# Patient Record
Sex: MALE | Race: WHITE | NOT HISPANIC OR LATINO | URBAN - METROPOLITAN AREA
[De-identification: names, ages, dates, MRNs, and addresses within clinical notes are randomized per-mention and may not be internally consistent; named-entity substitution may affect disease eponyms.]

---

## 2017-01-10 ASSESSMENT — MIFFLIN-ST. JEOR: SCORE: 1380.26

## 2017-01-12 ENCOUNTER — SURGERY - HEALTHEAST (OUTPATIENT)
Dept: SURGERY | Facility: CLINIC | Age: 82
End: 2017-01-12

## 2017-01-12 ENCOUNTER — ANESTHESIA - HEALTHEAST (OUTPATIENT)
Dept: SURGERY | Facility: CLINIC | Age: 82
End: 2017-01-12

## 2017-01-16 ENCOUNTER — COMMUNICATION - HEALTHEAST (OUTPATIENT)
Dept: INTERNAL MEDICINE | Facility: CLINIC | Age: 82
End: 2017-01-16

## 2017-01-26 ENCOUNTER — OFFICE VISIT - HEALTHEAST (OUTPATIENT)
Dept: INTERNAL MEDICINE | Facility: CLINIC | Age: 82
End: 2017-01-26

## 2017-01-26 ENCOUNTER — RECORDS - HEALTHEAST (OUTPATIENT)
Dept: ADMINISTRATIVE | Facility: OTHER | Age: 82
End: 2017-01-26

## 2017-01-26 DIAGNOSIS — K50.90 CROHN'S DISEASE WITHOUT COMPLICATION, UNSPECIFIED GASTROINTESTINAL TRACT LOCATION (H): ICD-10-CM

## 2017-01-26 ASSESSMENT — MIFFLIN-ST. JEOR: SCORE: 1407.47

## 2017-02-02 ENCOUNTER — RECORDS - HEALTHEAST (OUTPATIENT)
Dept: ADMINISTRATIVE | Facility: OTHER | Age: 82
End: 2017-02-02

## 2017-02-03 ENCOUNTER — RECORDS - HEALTHEAST (OUTPATIENT)
Dept: ADMINISTRATIVE | Facility: OTHER | Age: 82
End: 2017-02-03

## 2017-02-22 ENCOUNTER — COMMUNICATION - HEALTHEAST (OUTPATIENT)
Dept: INTERNAL MEDICINE | Facility: CLINIC | Age: 82
End: 2017-02-22

## 2017-03-07 ENCOUNTER — COMMUNICATION - HEALTHEAST (OUTPATIENT)
Dept: INTERNAL MEDICINE | Facility: CLINIC | Age: 82
End: 2017-03-07

## 2017-03-18 ENCOUNTER — COMMUNICATION - HEALTHEAST (OUTPATIENT)
Dept: INTERNAL MEDICINE | Facility: CLINIC | Age: 82
End: 2017-03-18

## 2017-03-18 DIAGNOSIS — J44.9 COPD (CHRONIC OBSTRUCTIVE PULMONARY DISEASE) (H): ICD-10-CM

## 2017-03-20 ENCOUNTER — COMMUNICATION - HEALTHEAST (OUTPATIENT)
Dept: INTERNAL MEDICINE | Facility: CLINIC | Age: 82
End: 2017-03-20

## 2017-03-21 ENCOUNTER — COMMUNICATION - HEALTHEAST (OUTPATIENT)
Dept: INTERNAL MEDICINE | Facility: CLINIC | Age: 82
End: 2017-03-21

## 2017-03-23 ENCOUNTER — OFFICE VISIT - HEALTHEAST (OUTPATIENT)
Dept: INTERNAL MEDICINE | Facility: CLINIC | Age: 82
End: 2017-03-23

## 2017-03-23 DIAGNOSIS — R13.10 DYSPHAGIA: ICD-10-CM

## 2017-03-23 ASSESSMENT — MIFFLIN-ST. JEOR: SCORE: 1448.29

## 2017-03-24 ENCOUNTER — COMMUNICATION - HEALTHEAST (OUTPATIENT)
Dept: INTERNAL MEDICINE | Facility: CLINIC | Age: 82
End: 2017-03-24

## 2017-03-24 ENCOUNTER — RECORDS - HEALTHEAST (OUTPATIENT)
Dept: ADMINISTRATIVE | Facility: OTHER | Age: 82
End: 2017-03-24

## 2017-04-13 ENCOUNTER — RECORDS - HEALTHEAST (OUTPATIENT)
Dept: ADMINISTRATIVE | Facility: OTHER | Age: 82
End: 2017-04-13

## 2017-05-04 ENCOUNTER — RECORDS - HEALTHEAST (OUTPATIENT)
Dept: ADMINISTRATIVE | Facility: OTHER | Age: 82
End: 2017-05-04

## 2017-05-14 ENCOUNTER — COMMUNICATION - HEALTHEAST (OUTPATIENT)
Dept: INTERNAL MEDICINE | Facility: CLINIC | Age: 82
End: 2017-05-14

## 2017-05-15 ENCOUNTER — COMMUNICATION - HEALTHEAST (OUTPATIENT)
Dept: INTERNAL MEDICINE | Facility: CLINIC | Age: 82
End: 2017-05-15

## 2017-05-15 DIAGNOSIS — G47.00 INSOMNIA, UNSPECIFIED TYPE: ICD-10-CM

## 2017-06-09 ENCOUNTER — COMMUNICATION - HEALTHEAST (OUTPATIENT)
Dept: SCHEDULING | Facility: CLINIC | Age: 82
End: 2017-06-09

## 2017-06-09 ENCOUNTER — OFFICE VISIT - HEALTHEAST (OUTPATIENT)
Dept: INTERNAL MEDICINE | Facility: CLINIC | Age: 82
End: 2017-06-09

## 2017-06-09 DIAGNOSIS — J42 CHRONIC BRONCHITIS, UNSPECIFIED CHRONIC BRONCHITIS TYPE (H): ICD-10-CM

## 2017-06-09 ASSESSMENT — MIFFLIN-ST. JEOR: SCORE: 1480.05

## 2017-06-12 ENCOUNTER — COMMUNICATION - HEALTHEAST (OUTPATIENT)
Dept: INTERNAL MEDICINE | Facility: CLINIC | Age: 82
End: 2017-06-12

## 2017-06-14 ENCOUNTER — COMMUNICATION - HEALTHEAST (OUTPATIENT)
Dept: INTERNAL MEDICINE | Facility: CLINIC | Age: 82
End: 2017-06-14

## 2017-06-15 ENCOUNTER — COMMUNICATION - HEALTHEAST (OUTPATIENT)
Dept: INTERNAL MEDICINE | Facility: CLINIC | Age: 82
End: 2017-06-15

## 2017-07-04 ENCOUNTER — COMMUNICATION - HEALTHEAST (OUTPATIENT)
Dept: INTERNAL MEDICINE | Facility: CLINIC | Age: 82
End: 2017-07-04

## 2017-07-04 DIAGNOSIS — J44.9 COPD (CHRONIC OBSTRUCTIVE PULMONARY DISEASE) (H): ICD-10-CM

## 2017-07-06 ENCOUNTER — RECORDS - HEALTHEAST (OUTPATIENT)
Dept: ADMINISTRATIVE | Facility: OTHER | Age: 82
End: 2017-07-06

## 2017-07-14 ENCOUNTER — RECORDS - HEALTHEAST (OUTPATIENT)
Dept: ADMINISTRATIVE | Facility: OTHER | Age: 82
End: 2017-07-14

## 2017-07-21 ENCOUNTER — RECORDS - HEALTHEAST (OUTPATIENT)
Dept: ADMINISTRATIVE | Facility: OTHER | Age: 82
End: 2017-07-21

## 2017-08-25 ENCOUNTER — COMMUNICATION - HEALTHEAST (OUTPATIENT)
Dept: SCHEDULING | Facility: CLINIC | Age: 82
End: 2017-08-25

## 2017-08-25 ENCOUNTER — AMBULATORY - HEALTHEAST (OUTPATIENT)
Dept: INTERNAL MEDICINE | Facility: CLINIC | Age: 82
End: 2017-08-25

## 2017-08-25 DIAGNOSIS — J44.9 COPD (CHRONIC OBSTRUCTIVE PULMONARY DISEASE) (H): ICD-10-CM

## 2017-08-28 ENCOUNTER — COMMUNICATION - HEALTHEAST (OUTPATIENT)
Dept: PULMONOLOGY | Facility: OTHER | Age: 82
End: 2017-08-28

## 2017-08-28 ENCOUNTER — AMBULATORY - HEALTHEAST (OUTPATIENT)
Dept: PULMONOLOGY | Facility: OTHER | Age: 82
End: 2017-08-28

## 2017-08-28 DIAGNOSIS — J44.9 COPD (CHRONIC OBSTRUCTIVE PULMONARY DISEASE) (H): ICD-10-CM

## 2017-09-08 ENCOUNTER — RECORDS - HEALTHEAST (OUTPATIENT)
Dept: ADMINISTRATIVE | Facility: OTHER | Age: 82
End: 2017-09-08

## 2017-09-14 ENCOUNTER — OFFICE VISIT - HEALTHEAST (OUTPATIENT)
Dept: PULMONOLOGY | Facility: OTHER | Age: 82
End: 2017-09-14

## 2017-09-14 DIAGNOSIS — J41.0 SIMPLE CHRONIC BRONCHITIS (H): ICD-10-CM

## 2017-09-14 DIAGNOSIS — J98.6 ELEVATED HEMIDIAPHRAGM: ICD-10-CM

## 2017-09-14 DIAGNOSIS — R06.09 DYSPNEA ON EXERTION: ICD-10-CM

## 2017-09-14 RX ORDER — FINASTERIDE 5 MG/1
5 TABLET, FILM COATED ORAL DAILY
Status: SHIPPED | COMMUNITY
Start: 2017-09-14

## 2017-09-14 ASSESSMENT — MIFFLIN-ST. JEOR: SCORE: 1485.94

## 2017-09-15 ENCOUNTER — RECORDS - HEALTHEAST (OUTPATIENT)
Dept: ADMINISTRATIVE | Facility: OTHER | Age: 82
End: 2017-09-15

## 2017-09-21 ENCOUNTER — RECORDS - HEALTHEAST (OUTPATIENT)
Dept: ADMINISTRATIVE | Facility: OTHER | Age: 82
End: 2017-09-21

## 2017-09-21 ENCOUNTER — AMBULATORY - HEALTHEAST (OUTPATIENT)
Dept: PULMONOLOGY | Facility: OTHER | Age: 82
End: 2017-09-21

## 2017-09-21 DIAGNOSIS — R06.02 SOB (SHORTNESS OF BREATH): ICD-10-CM

## 2017-09-21 DIAGNOSIS — J44.1 COPD EXACERBATION (H): ICD-10-CM

## 2017-09-28 ENCOUNTER — OFFICE VISIT - HEALTHEAST (OUTPATIENT)
Dept: PULMONOLOGY | Facility: OTHER | Age: 82
End: 2017-09-28

## 2017-09-28 DIAGNOSIS — J41.0 SIMPLE CHRONIC BRONCHITIS (H): ICD-10-CM

## 2017-09-28 DIAGNOSIS — F41.9 ANXIETY: ICD-10-CM

## 2017-09-28 DIAGNOSIS — J96.11 CHRONIC RESPIRATORY FAILURE WITH HYPOXIA (H): ICD-10-CM

## 2017-09-29 ENCOUNTER — AMBULATORY - HEALTHEAST (OUTPATIENT)
Dept: PULMONOLOGY | Facility: OTHER | Age: 82
End: 2017-09-29

## 2017-09-29 ENCOUNTER — RECORDS - HEALTHEAST (OUTPATIENT)
Dept: ADMINISTRATIVE | Facility: OTHER | Age: 82
End: 2017-09-29

## 2017-09-29 DIAGNOSIS — J41.0 SIMPLE CHRONIC BRONCHITIS (H): ICD-10-CM

## 2017-10-03 ENCOUNTER — COMMUNICATION - HEALTHEAST (OUTPATIENT)
Dept: PULMONOLOGY | Facility: OTHER | Age: 82
End: 2017-10-03

## 2017-10-03 DIAGNOSIS — R19.7 DIARRHEA: ICD-10-CM

## 2017-10-08 ENCOUNTER — COMMUNICATION - HEALTHEAST (OUTPATIENT)
Dept: INTERNAL MEDICINE | Facility: CLINIC | Age: 82
End: 2017-10-08

## 2017-10-10 ENCOUNTER — COMMUNICATION - HEALTHEAST (OUTPATIENT)
Dept: INTERNAL MEDICINE | Facility: CLINIC | Age: 82
End: 2017-10-10

## 2017-10-12 ENCOUNTER — OFFICE VISIT - HEALTHEAST (OUTPATIENT)
Dept: INTERNAL MEDICINE | Facility: CLINIC | Age: 82
End: 2017-10-12

## 2017-10-12 DIAGNOSIS — K50.90 CROHN'S DISEASE WITHOUT COMPLICATION, UNSPECIFIED GASTROINTESTINAL TRACT LOCATION (H): ICD-10-CM

## 2017-10-12 DIAGNOSIS — R53.83 FATIGUE DUE TO TREATMENT: ICD-10-CM

## 2017-10-12 DIAGNOSIS — I10 ESSENTIAL HYPERTENSION WITH GOAL BLOOD PRESSURE LESS THAN 140/90: ICD-10-CM

## 2017-10-12 DIAGNOSIS — G25.0 ESSENTIAL TREMOR: ICD-10-CM

## 2017-10-12 DIAGNOSIS — J41.0 SIMPLE CHRONIC BRONCHITIS (H): ICD-10-CM

## 2017-10-12 DIAGNOSIS — K21.9 GERD WITHOUT ESOPHAGITIS: ICD-10-CM

## 2017-10-12 ASSESSMENT — MIFFLIN-ST. JEOR: SCORE: 1452.83

## 2017-10-13 ENCOUNTER — COMMUNICATION - HEALTHEAST (OUTPATIENT)
Dept: INTERNAL MEDICINE | Facility: CLINIC | Age: 82
End: 2017-10-13

## 2017-10-19 ENCOUNTER — RECORDS - HEALTHEAST (OUTPATIENT)
Dept: ADMINISTRATIVE | Facility: OTHER | Age: 82
End: 2017-10-19

## 2017-10-26 ENCOUNTER — OFFICE VISIT - HEALTHEAST (OUTPATIENT)
Dept: INTERNAL MEDICINE | Facility: CLINIC | Age: 82
End: 2017-10-26

## 2017-10-26 DIAGNOSIS — J41.0 SIMPLE CHRONIC BRONCHITIS (H): ICD-10-CM

## 2017-11-01 ENCOUNTER — COMMUNICATION - HEALTHEAST (OUTPATIENT)
Dept: INTERNAL MEDICINE | Facility: CLINIC | Age: 82
End: 2017-11-01

## 2017-11-01 DIAGNOSIS — G47.00 INSOMNIA, UNSPECIFIED TYPE: ICD-10-CM

## 2017-11-09 ENCOUNTER — OFFICE VISIT - HEALTHEAST (OUTPATIENT)
Dept: PULMONOLOGY | Facility: OTHER | Age: 82
End: 2017-11-09

## 2017-11-09 ENCOUNTER — RECORDS - HEALTHEAST (OUTPATIENT)
Dept: ADMINISTRATIVE | Facility: OTHER | Age: 82
End: 2017-11-09

## 2017-11-09 DIAGNOSIS — G47.30 SLEEP-DISORDERED BREATHING: ICD-10-CM

## 2017-11-09 DIAGNOSIS — J41.0 SIMPLE CHRONIC BRONCHITIS (H): ICD-10-CM

## 2017-11-09 DIAGNOSIS — J96.11 CHRONIC RESPIRATORY FAILURE WITH HYPOXIA (H): ICD-10-CM

## 2017-11-10 ENCOUNTER — RECORDS - HEALTHEAST (OUTPATIENT)
Dept: ADMINISTRATIVE | Facility: OTHER | Age: 82
End: 2017-11-10

## 2017-11-14 ENCOUNTER — RECORDS - HEALTHEAST (OUTPATIENT)
Dept: ADMINISTRATIVE | Facility: OTHER | Age: 82
End: 2017-11-14

## 2017-11-20 ENCOUNTER — COMMUNICATION - HEALTHEAST (OUTPATIENT)
Dept: INTERNAL MEDICINE | Facility: CLINIC | Age: 82
End: 2017-11-20

## 2017-11-21 ENCOUNTER — COMMUNICATION - HEALTHEAST (OUTPATIENT)
Dept: PULMONOLOGY | Facility: OTHER | Age: 82
End: 2017-11-21

## 2017-11-21 ENCOUNTER — RECORDS - HEALTHEAST (OUTPATIENT)
Dept: ADMINISTRATIVE | Facility: OTHER | Age: 82
End: 2017-11-21

## 2017-11-27 ENCOUNTER — OFFICE VISIT - HEALTHEAST (OUTPATIENT)
Dept: INTERNAL MEDICINE | Facility: CLINIC | Age: 82
End: 2017-11-27

## 2017-11-27 ENCOUNTER — RECORDS - HEALTHEAST (OUTPATIENT)
Dept: ADMINISTRATIVE | Facility: OTHER | Age: 82
End: 2017-11-27

## 2017-11-27 ENCOUNTER — AMBULATORY - HEALTHEAST (OUTPATIENT)
Dept: INTERNAL MEDICINE | Facility: CLINIC | Age: 82
End: 2017-11-27

## 2017-11-27 DIAGNOSIS — J41.0 SIMPLE CHRONIC BRONCHITIS (H): ICD-10-CM

## 2017-11-27 ASSESSMENT — MIFFLIN-ST. JEOR: SCORE: 1475.51

## 2017-11-28 ENCOUNTER — COMMUNICATION - HEALTHEAST (OUTPATIENT)
Dept: INTERNAL MEDICINE | Facility: CLINIC | Age: 82
End: 2017-11-28

## 2017-11-29 ENCOUNTER — RECORDS - HEALTHEAST (OUTPATIENT)
Dept: ADMINISTRATIVE | Facility: OTHER | Age: 82
End: 2017-11-29

## 2017-12-08 ENCOUNTER — COMMUNICATION - HEALTHEAST (OUTPATIENT)
Dept: INTERNAL MEDICINE | Facility: CLINIC | Age: 82
End: 2017-12-08

## 2018-01-29 ENCOUNTER — OFFICE VISIT - HEALTHEAST (OUTPATIENT)
Dept: INTERNAL MEDICINE | Facility: CLINIC | Age: 83
End: 2018-01-29

## 2018-01-29 DIAGNOSIS — K50.119 CROHN'S DISEASE OF LARGE INTESTINE WITH COMPLICATION (H): ICD-10-CM

## 2018-01-29 ASSESSMENT — MIFFLIN-ST. JEOR: SCORE: 1489.12

## 2018-02-02 ENCOUNTER — RECORDS - HEALTHEAST (OUTPATIENT)
Dept: ADMINISTRATIVE | Facility: OTHER | Age: 83
End: 2018-02-02

## 2018-02-27 ENCOUNTER — OFFICE VISIT - HEALTHEAST (OUTPATIENT)
Dept: PULMONOLOGY | Facility: OTHER | Age: 83
End: 2018-02-27

## 2018-02-27 DIAGNOSIS — R06.09 DYSPNEA ON EXERTION: ICD-10-CM

## 2018-02-27 DIAGNOSIS — J41.0 SIMPLE CHRONIC BRONCHITIS (H): ICD-10-CM

## 2018-03-12 ENCOUNTER — COMMUNICATION - HEALTHEAST (OUTPATIENT)
Dept: INTERNAL MEDICINE | Facility: CLINIC | Age: 83
End: 2018-03-12

## 2018-03-20 ENCOUNTER — OFFICE VISIT - HEALTHEAST (OUTPATIENT)
Dept: INTERNAL MEDICINE | Facility: CLINIC | Age: 83
End: 2018-03-20

## 2018-03-20 DIAGNOSIS — J41.0 SIMPLE CHRONIC BRONCHITIS (H): ICD-10-CM

## 2018-03-20 ASSESSMENT — MIFFLIN-ST. JEOR: SCORE: 1502.73

## 2018-03-29 ENCOUNTER — RECORDS - HEALTHEAST (OUTPATIENT)
Dept: ADMINISTRATIVE | Facility: OTHER | Age: 83
End: 2018-03-29

## 2018-04-24 ENCOUNTER — COMMUNICATION - HEALTHEAST (OUTPATIENT)
Dept: SCHEDULING | Facility: CLINIC | Age: 83
End: 2018-04-24

## 2018-04-24 DIAGNOSIS — R06.2 WHEEZING: ICD-10-CM

## 2018-04-24 RX ORDER — ALBUTEROL SULFATE 90 UG/1
2 AEROSOL, METERED RESPIRATORY (INHALATION) EVERY 6 HOURS PRN
Qty: 1 INHALER | Refills: 3 | Status: SHIPPED | OUTPATIENT
Start: 2018-04-24

## 2018-06-12 ENCOUNTER — AMBULATORY - HEALTHEAST (OUTPATIENT)
Dept: PULMONOLOGY | Facility: OTHER | Age: 83
End: 2018-06-12

## 2018-06-12 ENCOUNTER — COMMUNICATION - HEALTHEAST (OUTPATIENT)
Dept: PULMONOLOGY | Facility: OTHER | Age: 83
End: 2018-06-12

## 2018-06-12 DIAGNOSIS — J41.0 SIMPLE CHRONIC BRONCHITIS (H): ICD-10-CM

## 2018-06-22 ENCOUNTER — OFFICE VISIT - HEALTHEAST (OUTPATIENT)
Dept: INTERNAL MEDICINE | Facility: CLINIC | Age: 83
End: 2018-06-22

## 2018-06-22 DIAGNOSIS — R06.02 SOB (SHORTNESS OF BREATH): ICD-10-CM

## 2018-06-22 DIAGNOSIS — J41.0 SIMPLE CHRONIC BRONCHITIS (H): ICD-10-CM

## 2018-06-22 ASSESSMENT — MIFFLIN-ST. JEOR: SCORE: 1557.16

## 2018-06-28 ENCOUNTER — RECORDS - HEALTHEAST (OUTPATIENT)
Dept: ADMINISTRATIVE | Facility: OTHER | Age: 83
End: 2018-06-28

## 2018-07-24 ENCOUNTER — OFFICE VISIT - HEALTHEAST (OUTPATIENT)
Dept: PULMONOLOGY | Facility: OTHER | Age: 83
End: 2018-07-24

## 2018-07-24 DIAGNOSIS — J43.9 PULMONARY EMPHYSEMA, UNSPECIFIED EMPHYSEMA TYPE (H): ICD-10-CM

## 2018-07-30 ENCOUNTER — COMMUNICATION - HEALTHEAST (OUTPATIENT)
Dept: INTERNAL MEDICINE | Facility: CLINIC | Age: 83
End: 2018-07-30

## 2018-09-05 ENCOUNTER — COMMUNICATION - HEALTHEAST (OUTPATIENT)
Dept: INTERNAL MEDICINE | Facility: CLINIC | Age: 83
End: 2018-09-05

## 2018-09-05 DIAGNOSIS — G47.00 INSOMNIA, UNSPECIFIED TYPE: ICD-10-CM

## 2018-09-12 ENCOUNTER — COMMUNICATION - HEALTHEAST (OUTPATIENT)
Dept: INTERNAL MEDICINE | Facility: CLINIC | Age: 83
End: 2018-09-12

## 2018-09-27 ENCOUNTER — RECORDS - HEALTHEAST (OUTPATIENT)
Dept: ADMINISTRATIVE | Facility: OTHER | Age: 83
End: 2018-09-27

## 2018-09-27 ENCOUNTER — OFFICE VISIT - HEALTHEAST (OUTPATIENT)
Dept: INTERNAL MEDICINE | Facility: CLINIC | Age: 83
End: 2018-09-27

## 2018-09-27 DIAGNOSIS — I10 ESSENTIAL HYPERTENSION WITH GOAL BLOOD PRESSURE LESS THAN 140/90: ICD-10-CM

## 2018-09-27 ASSESSMENT — MIFFLIN-ST. JEOR: SCORE: 1520.87

## 2018-10-04 ENCOUNTER — COMMUNICATION - HEALTHEAST (OUTPATIENT)
Dept: INTERNAL MEDICINE | Facility: CLINIC | Age: 83
End: 2018-10-04

## 2018-10-11 ENCOUNTER — OFFICE VISIT - HEALTHEAST (OUTPATIENT)
Dept: INTERNAL MEDICINE | Facility: CLINIC | Age: 83
End: 2018-10-11

## 2018-10-11 DIAGNOSIS — R06.02 SOB (SHORTNESS OF BREATH): ICD-10-CM

## 2018-10-11 DIAGNOSIS — Z01.818 PREOPERATIVE EXAMINATION: ICD-10-CM

## 2018-10-11 LAB
ALBUMIN SERPL-MCNC: 3.3 G/DL (ref 3.5–5)
ALP SERPL-CCNC: 70 U/L (ref 45–120)
ALT SERPL W P-5'-P-CCNC: 16 U/L (ref 0–45)
ANION GAP SERPL CALCULATED.3IONS-SCNC: 9 MMOL/L (ref 5–18)
AST SERPL W P-5'-P-CCNC: 18 U/L (ref 0–40)
ATRIAL RATE - MUSE: 59 BPM
BILIRUB SERPL-MCNC: 1 MG/DL (ref 0–1)
BNP SERPL-MCNC: 229 PG/ML (ref 0–93)
BUN SERPL-MCNC: 18 MG/DL (ref 8–28)
CALCIUM SERPL-MCNC: 8.9 MG/DL (ref 8.5–10.5)
CHLORIDE BLD-SCNC: 105 MMOL/L (ref 98–107)
CO2 SERPL-SCNC: 27 MMOL/L (ref 22–31)
CREAT SERPL-MCNC: 1.44 MG/DL (ref 0.7–1.3)
DIASTOLIC BLOOD PRESSURE - MUSE: NORMAL MMHG
ERYTHROCYTE [DISTWIDTH] IN BLOOD BY AUTOMATED COUNT: 13.8 % (ref 11–14.5)
GFR SERPL CREATININE-BSD FRML MDRD: 47 ML/MIN/1.73M2
GLUCOSE BLD-MCNC: 96 MG/DL (ref 70–125)
HCT VFR BLD AUTO: 47 % (ref 40–54)
HGB BLD-MCNC: 15 G/DL (ref 14–18)
INTERPRETATION ECG - MUSE: NORMAL
MCH RBC QN AUTO: 28.4 PG (ref 27–34)
MCHC RBC AUTO-ENTMCNC: 31.9 G/DL (ref 32–36)
MCV RBC AUTO: 89 FL (ref 80–100)
P AXIS - MUSE: 6 DEGREES
PLATELET # BLD AUTO: 159 THOU/UL (ref 140–440)
PMV BLD AUTO: 7.7 FL (ref 7–10)
POTASSIUM BLD-SCNC: 4.1 MMOL/L (ref 3.5–5)
PR INTERVAL - MUSE: 172 MS
PROT SERPL-MCNC: 5.9 G/DL (ref 6–8)
QRS DURATION - MUSE: 132 MS
QT - MUSE: 478 MS
QTC - MUSE: 473 MS
R AXIS - MUSE: -77 DEGREES
RBC # BLD AUTO: 5.27 MILL/UL (ref 4.4–6.2)
SODIUM SERPL-SCNC: 141 MMOL/L (ref 136–145)
SYSTOLIC BLOOD PRESSURE - MUSE: NORMAL MMHG
T AXIS - MUSE: 20 DEGREES
TSH SERPL DL<=0.005 MIU/L-ACNC: 0.94 UIU/ML (ref 0.3–5)
VENTRICULAR RATE- MUSE: 59 BPM
WBC: 8.2 THOU/UL (ref 4–11)

## 2018-10-11 RX ORDER — DIAZEPAM 2 MG
2 TABLET ORAL 2 TIMES DAILY
Status: SHIPPED | COMMUNITY
Start: 2018-10-11

## 2018-10-11 RX ORDER — FUROSEMIDE 20 MG
20 TABLET ORAL DAILY
Qty: 7 TABLET | Refills: 0 | Status: SHIPPED | OUTPATIENT
Start: 2018-10-11 | End: 2018-11-14

## 2018-10-11 ASSESSMENT — MIFFLIN-ST. JEOR: SCORE: 1525.41

## 2018-10-12 ENCOUNTER — COMMUNICATION - HEALTHEAST (OUTPATIENT)
Dept: INTERNAL MEDICINE | Facility: CLINIC | Age: 83
End: 2018-10-12

## 2018-10-21 ENCOUNTER — COMMUNICATION - HEALTHEAST (OUTPATIENT)
Dept: INTERNAL MEDICINE | Facility: CLINIC | Age: 83
End: 2018-10-21

## 2018-10-22 ENCOUNTER — COMMUNICATION - HEALTHEAST (OUTPATIENT)
Dept: INTERNAL MEDICINE | Facility: CLINIC | Age: 83
End: 2018-10-22

## 2018-10-26 ENCOUNTER — RECORDS - HEALTHEAST (OUTPATIENT)
Dept: ADMINISTRATIVE | Facility: OTHER | Age: 83
End: 2018-10-26

## 2018-10-29 ENCOUNTER — OFFICE VISIT - HEALTHEAST (OUTPATIENT)
Dept: INTERNAL MEDICINE | Facility: CLINIC | Age: 83
End: 2018-10-29

## 2018-10-29 DIAGNOSIS — K50.90 CROHN'S DISEASE WITHOUT COMPLICATION, UNSPECIFIED GASTROINTESTINAL TRACT LOCATION (H): ICD-10-CM

## 2018-10-29 ASSESSMENT — MIFFLIN-ST. JEOR: SCORE: 1511.8

## 2018-11-14 ENCOUNTER — OFFICE VISIT - HEALTHEAST (OUTPATIENT)
Dept: PULMONOLOGY | Facility: OTHER | Age: 83
End: 2018-11-14

## 2018-11-14 DIAGNOSIS — J41.0 SIMPLE CHRONIC BRONCHITIS (H): ICD-10-CM

## 2018-11-14 RX ORDER — ALBUTEROL SULFATE 0.83 MG/ML
2.5 SOLUTION RESPIRATORY (INHALATION) EVERY 6 HOURS PRN
Qty: 90 VIAL | Refills: 11 | Status: SHIPPED | OUTPATIENT
Start: 2018-11-14

## 2018-12-17 ENCOUNTER — RECORDS - HEALTHEAST (OUTPATIENT)
Dept: ADMINISTRATIVE | Facility: OTHER | Age: 83
End: 2018-12-17

## 2018-12-29 ENCOUNTER — COMMUNICATION - HEALTHEAST (OUTPATIENT)
Dept: INTERNAL MEDICINE | Facility: CLINIC | Age: 83
End: 2018-12-29

## 2019-01-24 ENCOUNTER — AMBULATORY - HEALTHEAST (OUTPATIENT)
Dept: INTERNAL MEDICINE | Facility: CLINIC | Age: 84
End: 2019-01-24

## 2019-01-24 ENCOUNTER — OFFICE VISIT - HEALTHEAST (OUTPATIENT)
Dept: INTERNAL MEDICINE | Facility: CLINIC | Age: 84
End: 2019-01-24

## 2019-01-24 DIAGNOSIS — I10 ESSENTIAL HYPERTENSION: ICD-10-CM

## 2019-01-24 ASSESSMENT — MIFFLIN-ST. JEOR: SCORE: 1511.8

## 2019-01-28 ENCOUNTER — COMMUNICATION - HEALTHEAST (OUTPATIENT)
Dept: INTERNAL MEDICINE | Facility: CLINIC | Age: 84
End: 2019-01-28

## 2019-01-28 DIAGNOSIS — R11.0 NAUSEA: ICD-10-CM

## 2019-01-28 DIAGNOSIS — R19.7 DIARRHEA: ICD-10-CM

## 2019-01-28 RX ORDER — ONDANSETRON 4 MG/1
4 TABLET, FILM COATED ORAL DAILY PRN
Qty: 40 TABLET | Refills: 1 | Status: SHIPPED | OUTPATIENT
Start: 2019-01-28

## 2019-01-28 RX ORDER — DIPHENOXYLATE HCL/ATROPINE 2.5-.025MG
1 TABLET ORAL DAILY
Qty: 40 TABLET | Refills: 1 | Status: SHIPPED | OUTPATIENT
Start: 2019-01-28

## 2019-02-25 ENCOUNTER — COMMUNICATION - HEALTHEAST (OUTPATIENT)
Dept: INTERNAL MEDICINE | Facility: CLINIC | Age: 84
End: 2019-02-25

## 2019-02-26 RX ORDER — TAMSULOSIN HYDROCHLORIDE 0.4 MG/1
CAPSULE ORAL
Qty: 90 CAPSULE | Refills: 6 | Status: SHIPPED | OUTPATIENT
Start: 2019-02-26

## 2019-03-18 ENCOUNTER — COMMUNICATION - HEALTHEAST (OUTPATIENT)
Dept: INTERNAL MEDICINE | Facility: CLINIC | Age: 84
End: 2019-03-18

## 2019-03-20 ENCOUNTER — COMMUNICATION - HEALTHEAST (OUTPATIENT)
Dept: INTERNAL MEDICINE | Facility: CLINIC | Age: 84
End: 2019-03-20

## 2019-03-28 ENCOUNTER — RECORDS - HEALTHEAST (OUTPATIENT)
Dept: ADMINISTRATIVE | Facility: OTHER | Age: 84
End: 2019-03-28

## 2019-04-08 ENCOUNTER — COMMUNICATION - HEALTHEAST (OUTPATIENT)
Dept: INTERNAL MEDICINE | Facility: CLINIC | Age: 84
End: 2019-04-08

## 2019-04-08 DIAGNOSIS — K21.9 GERD WITHOUT ESOPHAGITIS: ICD-10-CM

## 2019-04-08 DIAGNOSIS — K50.90 CROHN'S DISEASE WITHOUT COMPLICATION, UNSPECIFIED GASTROINTESTINAL TRACT LOCATION (H): ICD-10-CM

## 2019-04-12 ENCOUNTER — OFFICE VISIT - HEALTHEAST (OUTPATIENT)
Dept: INTERNAL MEDICINE | Facility: CLINIC | Age: 84
End: 2019-04-12

## 2019-04-12 ENCOUNTER — COMMUNICATION - HEALTHEAST (OUTPATIENT)
Dept: INTERNAL MEDICINE | Facility: CLINIC | Age: 84
End: 2019-04-12

## 2019-04-12 DIAGNOSIS — J41.1 MUCOPURULENT CHRONIC BRONCHITIS (H): ICD-10-CM

## 2019-04-12 DIAGNOSIS — J44.1 CHRONIC OBSTRUCTIVE PULMONARY DISEASE WITH ACUTE EXACERBATION (H): ICD-10-CM

## 2019-04-12 LAB
ANION GAP SERPL CALCULATED.3IONS-SCNC: 11 MMOL/L (ref 5–18)
BNP SERPL-MCNC: 188 PG/ML (ref 0–93)
BUN SERPL-MCNC: 22 MG/DL (ref 8–28)
CALCIUM SERPL-MCNC: 9.2 MG/DL (ref 8.5–10.5)
CHLORIDE BLD-SCNC: 106 MMOL/L (ref 98–107)
CO2 SERPL-SCNC: 25 MMOL/L (ref 22–31)
CREAT SERPL-MCNC: 1.46 MG/DL (ref 0.7–1.3)
GFR SERPL CREATININE-BSD FRML MDRD: 46 ML/MIN/1.73M2
GLUCOSE BLD-MCNC: 98 MG/DL (ref 70–125)
POTASSIUM BLD-SCNC: 4 MMOL/L (ref 3.5–5)
SODIUM SERPL-SCNC: 142 MMOL/L (ref 136–145)

## 2019-04-12 ASSESSMENT — MIFFLIN-ST. JEOR: SCORE: 1525.41

## 2019-04-13 ENCOUNTER — COMMUNICATION - HEALTHEAST (OUTPATIENT)
Dept: SCHEDULING | Facility: CLINIC | Age: 84
End: 2019-04-13

## 2019-05-06 ENCOUNTER — OFFICE VISIT - HEALTHEAST (OUTPATIENT)
Dept: INTERNAL MEDICINE | Facility: CLINIC | Age: 84
End: 2019-05-06

## 2019-05-06 DIAGNOSIS — J41.1 MUCOPURULENT CHRONIC BRONCHITIS (H): ICD-10-CM

## 2019-05-06 ASSESSMENT — MIFFLIN-ST. JEOR: SCORE: 1520.87

## 2019-05-10 ENCOUNTER — AMBULATORY - HEALTHEAST (OUTPATIENT)
Dept: PULMONOLOGY | Facility: OTHER | Age: 84
End: 2019-05-10

## 2019-05-10 DIAGNOSIS — J44.9 COPD (CHRONIC OBSTRUCTIVE PULMONARY DISEASE) (H): ICD-10-CM

## 2019-05-21 ENCOUNTER — COMMUNICATION - HEALTHEAST (OUTPATIENT)
Dept: INTERNAL MEDICINE | Facility: CLINIC | Age: 84
End: 2019-05-21

## 2019-05-21 DIAGNOSIS — K50.90 CROHN'S DISEASE WITHOUT COMPLICATION, UNSPECIFIED GASTROINTESTINAL TRACT LOCATION (H): ICD-10-CM

## 2019-05-29 ENCOUNTER — COMMUNICATION - HEALTHEAST (OUTPATIENT)
Dept: PULMONOLOGY | Facility: OTHER | Age: 84
End: 2019-05-29

## 2019-05-29 ENCOUNTER — AMBULATORY - HEALTHEAST (OUTPATIENT)
Dept: PULMONOLOGY | Facility: OTHER | Age: 84
End: 2019-05-29

## 2019-05-29 DIAGNOSIS — J44.1 COPD EXACERBATION (H): ICD-10-CM

## 2019-06-03 ENCOUNTER — OFFICE VISIT - HEALTHEAST (OUTPATIENT)
Dept: PULMONOLOGY | Facility: OTHER | Age: 84
End: 2019-06-03

## 2019-06-03 ENCOUNTER — HOSPITAL ENCOUNTER (OUTPATIENT)
Dept: RADIOLOGY | Facility: HOSPITAL | Age: 84
Discharge: HOME | End: 2019-06-03
Attending: INTERNAL MEDICINE

## 2019-06-03 DIAGNOSIS — J41.0 SIMPLE CHRONIC BRONCHITIS (H): ICD-10-CM

## 2019-06-03 RX ORDER — FUROSEMIDE 40 MG
40 TABLET ORAL DAILY
Qty: 5 TABLET | Refills: 0 | Status: SHIPPED | OUTPATIENT
Start: 2019-06-03 | End: 2019-06-08

## 2019-06-05 ENCOUNTER — COMMUNICATION - HEALTHEAST (OUTPATIENT)
Dept: PULMONOLOGY | Facility: OTHER | Age: 84
End: 2019-06-05

## 2019-06-19 ENCOUNTER — RECORDS - HEALTHEAST (OUTPATIENT)
Dept: ADMINISTRATIVE | Facility: OTHER | Age: 84
End: 2019-06-19

## 2019-06-21 ENCOUNTER — RECORDS - HEALTHEAST (OUTPATIENT)
Dept: ADMINISTRATIVE | Facility: OTHER | Age: 84
End: 2019-06-21

## 2019-06-24 ENCOUNTER — COMMUNICATION - HEALTHEAST (OUTPATIENT)
Dept: SCHEDULING | Facility: CLINIC | Age: 84
End: 2019-06-24

## 2019-07-15 ENCOUNTER — COMMUNICATION - HEALTHEAST (OUTPATIENT)
Dept: INTERNAL MEDICINE | Facility: CLINIC | Age: 84
End: 2019-07-15

## 2019-07-15 DIAGNOSIS — K50.90 CROHN'S DISEASE WITHOUT COMPLICATION, UNSPECIFIED GASTROINTESTINAL TRACT LOCATION (H): ICD-10-CM

## 2019-07-15 DIAGNOSIS — G47.00 INSOMNIA, UNSPECIFIED TYPE: ICD-10-CM

## 2019-07-16 RX ORDER — TRAZODONE HYDROCHLORIDE 100 MG/1
TABLET ORAL
Qty: 90 TABLET | Refills: 3 | Status: SHIPPED | OUTPATIENT
Start: 2019-07-16

## 2019-07-16 RX ORDER — TRAMADOL HYDROCHLORIDE 50 MG/1
TABLET ORAL
Qty: 30 TABLET | Refills: 1 | Status: SHIPPED | OUTPATIENT
Start: 2019-07-16

## 2019-08-05 ENCOUNTER — COMMUNICATION - HEALTHEAST (OUTPATIENT)
Dept: SCHEDULING | Facility: CLINIC | Age: 84
End: 2019-08-05

## 2019-08-06 ENCOUNTER — AMBULATORY - HEALTHEAST (OUTPATIENT)
Dept: INTERNAL MEDICINE | Facility: CLINIC | Age: 84
End: 2019-08-06

## 2019-08-06 ENCOUNTER — COMMUNICATION - HEALTHEAST (OUTPATIENT)
Dept: INTERNAL MEDICINE | Facility: CLINIC | Age: 84
End: 2019-08-06

## 2019-08-06 ENCOUNTER — OFFICE VISIT - HEALTHEAST (OUTPATIENT)
Dept: INTERNAL MEDICINE | Facility: CLINIC | Age: 84
End: 2019-08-06

## 2019-08-06 DIAGNOSIS — J41.1 MUCOPURULENT CHRONIC BRONCHITIS (H): ICD-10-CM

## 2019-08-06 LAB
ALBUMIN SERPL-MCNC: 2.1 G/DL (ref 3.5–5)
ALP SERPL-CCNC: 118 U/L (ref 45–120)
ALT SERPL W P-5'-P-CCNC: 14 U/L (ref 0–45)
ANION GAP SERPL CALCULATED.3IONS-SCNC: 10 MMOL/L (ref 5–18)
AST SERPL W P-5'-P-CCNC: 11 U/L (ref 0–40)
BILIRUB SERPL-MCNC: 0.7 MG/DL (ref 0–1)
BUN SERPL-MCNC: 24 MG/DL (ref 8–28)
CALCIUM SERPL-MCNC: 9.2 MG/DL (ref 8.5–10.5)
CHLORIDE BLD-SCNC: 104 MMOL/L (ref 98–107)
CO2 SERPL-SCNC: 25 MMOL/L (ref 22–31)
CREAT SERPL-MCNC: 1.3 MG/DL (ref 0.7–1.3)
ERYTHROCYTE [DISTWIDTH] IN BLOOD BY AUTOMATED COUNT: 11.8 % (ref 11–14.5)
GFR SERPL CREATININE-BSD FRML MDRD: 53 ML/MIN/1.73M2
GLUCOSE BLD-MCNC: 105 MG/DL (ref 70–125)
HCT VFR BLD AUTO: 42.4 % (ref 40–54)
HGB BLD-MCNC: 13.8 G/DL (ref 14–18)
MCH RBC QN AUTO: 30.8 PG (ref 27–34)
MCHC RBC AUTO-ENTMCNC: 32.5 G/DL (ref 32–36)
MCV RBC AUTO: 95 FL (ref 80–100)
PLATELET # BLD AUTO: 292 THOU/UL (ref 140–440)
PMV BLD AUTO: 7.6 FL (ref 7–10)
POTASSIUM BLD-SCNC: 4.2 MMOL/L (ref 3.5–5)
PROT SERPL-MCNC: 6.5 G/DL (ref 6–8)
RBC # BLD AUTO: 4.49 MILL/UL (ref 4.4–6.2)
SODIUM SERPL-SCNC: 139 MMOL/L (ref 136–145)
WBC: 21.8 THOU/UL (ref 4–11)

## 2019-08-06 ASSESSMENT — MIFFLIN-ST. JEOR: SCORE: 1498.19

## 2019-08-07 ENCOUNTER — COMMUNICATION - HEALTHEAST (OUTPATIENT)
Dept: INTERNAL MEDICINE | Facility: CLINIC | Age: 84
End: 2019-08-07

## 2019-08-13 ENCOUNTER — RECORDS - HEALTHEAST (OUTPATIENT)
Dept: ADMINISTRATIVE | Facility: OTHER | Age: 84
End: 2019-08-13

## 2019-08-15 ENCOUNTER — COMMUNICATION - HEALTHEAST (OUTPATIENT)
Dept: INTERNAL MEDICINE | Facility: CLINIC | Age: 84
End: 2019-08-15

## 2019-08-23 ENCOUNTER — COMMUNICATION - HEALTHEAST (OUTPATIENT)
Dept: SCHEDULING | Facility: CLINIC | Age: 84
End: 2019-08-23

## 2019-08-24 ENCOUNTER — RECORDS - HEALTHEAST (OUTPATIENT)
Dept: ADMINISTRATIVE | Facility: OTHER | Age: 84
End: 2019-08-24

## 2019-08-26 ENCOUNTER — COMMUNICATION - HEALTHEAST (OUTPATIENT)
Dept: INTERNAL MEDICINE | Facility: CLINIC | Age: 84
End: 2019-08-26

## 2019-08-29 ENCOUNTER — RECORDS - HEALTHEAST (OUTPATIENT)
Dept: ADMINISTRATIVE | Facility: OTHER | Age: 84
End: 2019-08-29

## 2019-09-12 ENCOUNTER — COMMUNICATION - HEALTHEAST (OUTPATIENT)
Dept: INTERNAL MEDICINE | Facility: CLINIC | Age: 84
End: 2019-09-12

## 2021-05-24 ENCOUNTER — RECORDS - HEALTHEAST (OUTPATIENT)
Dept: ADMINISTRATIVE | Facility: CLINIC | Age: 86
End: 2021-05-24

## 2021-05-26 ENCOUNTER — RECORDS - HEALTHEAST (OUTPATIENT)
Dept: ADMINISTRATIVE | Facility: CLINIC | Age: 86
End: 2021-05-26

## 2021-05-27 ENCOUNTER — RECORDS - HEALTHEAST (OUTPATIENT)
Dept: ADMINISTRATIVE | Facility: CLINIC | Age: 86
End: 2021-05-27

## 2021-05-27 NOTE — TELEPHONE ENCOUNTER
Pt is currently on 20mg of prednisone daily. Pt has COPD.    Pt would like to know if he should increase his prednisone?    Is also wondering if he needs a z-paula?    Still having shortness of breath, coughing, and wheezing.    No chest pain    Cough is productive. Has been productive for the past week    Is doing his nebs two times a day     The nebs help some    No temp    Pt is flying out to [place] on Wed.      Anyi aMjor RN  [facility] Medication Refill and Triage Nurse  4/12/2019  9:42 AM      Reason for Disposition    Wheezing is present    [1] Known COPD or other severe lung disease (i.e., bronchiectasis, cystic fibrosis, lung surgery) AND [2] worsening symptoms (i.e., increased sputum purulence or amount, increased breathing difficulty    Protocols used: COUGH - ACUTE [id number]

## 2021-05-27 NOTE — TELEPHONE ENCOUNTER
Results note:  Notes recorded by Chikis Aguirre MD on 4/13/2019 at 7:07 PM CDT  Just checking to see how patient is doing and letting them know that the final xray report and labs did not show evidence of pneumonia or heart failure . His heart size is slightly enalrged but has been for a long time     Attempted to contact patient: no answer, LMTCB #1 parker Ruiz RN [facility] Triage Nurse

## 2021-05-27 NOTE — TELEPHONE ENCOUNTER
Controlled Substance Refill Request  Medication:   Requested Prescriptions     Pending Prescriptions Disp Refills     traMADol (ULTRAM) 50 mg tablet [Pharmacy Med Name: TRAMADOL 50MG TAB] 30 tablet 1     Sig: TAKE 1 TABLET BY MOUTH EVERY 8 HOURS AS NEEDED FOR PAIN     omeprazole (PRILOSEC) 20 MG capsule [Pharmacy Med Name: OMEPRAZOLE 20MG CAP] 90 capsule 1     Sig: TAKE 1 CAPSULE BY MOUTH ONCE DAILY     Date Last Fill:2/26/19  Pharmacy: [facility]   Submit electronically to pharmacy    Controlled Substance Agreement on File:   Encounter-Level CSA Scan Date - 12/22/2016:    Scan on 12/22/2016 (below)         Last office visit with primary: 1/24/2019

## 2021-05-27 NOTE — TELEPHONE ENCOUNTER
Pt's wife is calling in to get missed message from triage nurse. Message was relayed to patient. Pt has no other questions.     Cem Granger RN [facility]Triage/Medication Refill

## 2021-05-27 NOTE — TELEPHONE ENCOUNTER
Called pt. He is always on Prednisone 5mg for his Crohn's but was recently put on Prednisone 20mg for a cough, yellow mucous, tightness in chest 8-10 days ago by Dr Trammell. Pt still has the symptoms and is wondering if he should be prescribed a Zpak as well. Please advise.

## 2021-05-27 NOTE — PATIENT INSTRUCTIONS - HE
incease to 40mg prednisone to take two of the 20mg for 5 days then 20mg once daily for 3 days then 10mg for three days then go back to your 5 mg   Ill decide about the lasix after reviewing your tests   When you come back call office for oxygen testing

## 2021-05-27 NOTE — PROGRESS NOTES
Office Visit - Follow Up   Tyron Sharma   84 y.o. male    Date of Visit: 4/12/2019         Assessment and Plan   1. Mucopurulent chronic bronchitis (H)  Increase to prednisone 40mg for 5 days and taper slowly . zpack for infection he is already taking maximum nebs and inhaler . Consider exercise oximetry as portable oxygen may improve his quality of life . Resting o2 is 90 % . I dont think he needs lasix , he has taken it periodically before . He has minimal pedal edema and very mild weight gain . Will check chest valent[place] and BNP    - Basic Metabolic Panel  - BNP(B-type Natriuretic Peptide)  - XR Chest 2 Views  - azithromycin (ZITHROMAX) 250 MG tablet; Take 2 tablets by mouth day one and 1 tablet by mouth days 2-5  Dispense: 6 tablet; Refill: 0    2. Chronic obstructive pulmonary disease with acute exacerbation (H)   Check markers for heart failure   - BNP(B-type Natriuretic Peptide)          Return for symptoms that worsen or do not improve, visit with me if your PCP is booked.     History of Present Illness   This 84 y.o. old pt of dr hendricks with no know h/o heart failure with increasing shortness of breath , increasing cough and mucopurulent discharge.   Already increased prednisone to 20mg for a week with no improvement . No fever , chills or chest pain .   Review of Systems: A comprehensive review of systems was negative except as noted.     Medications, Allergies and Problem List   Reviewed, reconciled and updated  Patient Active Problem List   Diagnosis     Benign Prostatic Hypertrophy     Hyperlipidemia     Crohn's (Granulomatous) Colitis     Blindness of one eye     Essential tremor     COPD (chronic obstructive pulmonary disease) (H)     S/P total hip arthroplasty     Hip osteoarthritis     Rotator cuff tear, right     Postoperative pain     Dislocated elbow     Elbow dislocation     Essential hypertension with goal blood pressure less than 140/90     Crohn's disease without complication,  "unspecified gastrointestinal tract location (H)     GERD without esophagitis     Dislocation, elbow          Physical Exam   General Appearance:       /60 (Patient Site: Right Arm, Patient Position: Sitting, Cuff Size: Adult Regular)   Pulse 64   Temp 97.5  F (36.4  C) (Oral)   Ht 5' 8\" (1.727 m)   Wt 192 lb (87.1 kg)   SpO2 90%   BMI 29.19 kg/m      General appearance - alert, well appearing, and in no distress  Mental status - alert, oriented to person, place, and time  Neck - supple, no significant adenopathy  Lymphatics - no palpable lymphadenopathy, no hepatosplenomegaly  Chest - bilateral tight wheezes, rales or rhonchi, symmetric air entry no percussion dullness   Heart - normal rate, regular rhythm, normal S1, S2, no murmurs, rubs, clicks or gallops  Abdomen - soft, nontender, distended but , no masses or organomegaly  BExtremities - peripheral pulses normal, mild pedal edema, no clubbing or cyanosis  Skin - rosacea extensive SK lesions                                                                                        Additional Information   Current Outpatient Medications   Medication Sig Dispense Refill     albuterol (PROAIR HFA;PROVENTIL HFA;VENTOLIN HFA) 90 mcg/actuation inhaler Inhale 2 puffs every 6 (six) hours as needed for wheezing. 1 Inhaler 3     albuterol (PROVENTIL) 2.5 mg /3 mL (0.083 %) nebulizer solution Take 3 mL (2.5 mg total) by nebulization every 6 (six) hours as needed for wheezing. 90 vial 11     artificial tears,hypromellose, (GENTEAL) 0.3 % Drop Administer 1 drop to both eyes 4 (four) times a day as needed (dry eye).       clindamycin (CLEOCIN T) 1 % lotion Apply 1 application topically 2 (two) times a day as needed. prn       D3/E/SE/SOY ISOFL/TOCOPH/LYCOP (PROSTATE 2.4 ORAL) Take 2 tablets by mouth daily.        diazePAM (VALIUM) 2 MG tablet Take 2 mg by mouth 2 (two) times a day.       diphenoxylate-atropine (LOMOTIL) 2.5-0.025 mg per tablet Take 1 tablet by mouth " daily. 40 tablet 1     finasteride (PROSCAR) 5 mg tablet Take 5 mg by mouth daily.       fluticasone (FLONASE) 50 mcg/actuation nasal spray 1 with each nostril twice daily. (Patient taking differently: 1 spray into each nostril 2 (two) times a day. 1 with each nostril twice daily.) 16 g 3     fluticasone-umeclidinium-vilanterol (TRELEGY ELLIPTA) 100-62.5-25 mcg DsDv inhaler Inhale 1 Inhalation daily.       ketoconazole (NIZORAL) 2 % shampoo Apply 1 application topically as needed for itching. Apply to damp skin, lather, leave on 5 minutes, and rinse       lactobacillus rhamnosus GG (CULTURELLE) 10-15 Billion cell capsule Take 1 capsule by mouth daily.       omeprazole (PRILOSEC) 20 MG capsule TAKE 1 CAPSULE BY MOUTH ONCE DAILY 90 capsule 1     ondansetron (ZOFRAN) 4 MG tablet Take 1 tablet (4 mg total) by mouth daily as needed for nausea. 40 tablet 1     predniSONE (DELTASONE) 20 MG tablet        predniSONE (DELTASONE) 5 MG tablet TAKE ONE TABLET BY MOUTH ONCE DAILY 90 tablet 4     propranolol (INDERAL LA) 60 mg 24 hr capsule Take 180 mg by mouth. 4 talbets daily       psyllium with dextrose (METAMUCIL JAR) powder Take by mouth daily.       saliva substitution combo no.9 (BIOTENE DRY MOUTH ORAL RINSE) Mwsh 5 mL by Mucous Membrane route as needed (dry mouth).        senna-docusate (PERICOLACE) 8.6-50 mg tablet Take 1 tablet by mouth 2 (two) times a day as needed for constipation.       tamsulosin (FLOMAX) 0.4 mg cap TAKE ONE CAPSULE(0.4 MG TOTAL) BY MOUTH ONCE DAILY AFTER SUPPER 90 capsule 6     traMADol (ULTRAM) 50 mg tablet TAKE 1 TABLET BY MOUTH EVERY 8 HOURS AS NEEDED FOR PAIN 30 tablet 1     traZODone (DESYREL) 100 MG tablet TAKE ONE TABLET BY MOUTH ONCE DAILY AT BEDTIME 90 tablet 3     azithromycin (ZITHROMAX) 250 MG tablet Take 2 tablets by mouth day one and 1 tablet by mouth days 2-5 6 tablet 0     furosemide (LASIX) 20 MG tablet Take 1 tablet (20 mg total) by mouth daily for 7 days. 7 tablet 0     No current  facility-administered medications for this visit.      Allergies   Allergen Reactions     Ciprofloxacin Diarrhea and Nausea Only     Keflex [Cephalexin] Diarrhea and Nausea Only     Social History     Tobacco Use     Smoking status: Former Smoker     Packs/day: 3.00     Years: 15.00     Pack years: 45.00     Last attempt to quit: 10/3/1983     Years since quittin.5     Smokeless tobacco: Never Used   Substance Use Topics     Alcohol use: Yes     Alcohol/week: 6.0 oz     Types: 10 Standard drinks or equivalent per week     Drug use: No     Past Medical History:   Diagnosis Date     Anxiety      Arthritis      Cancer (H)     Skin cancer     Crohn's disease (H)      Emphysema of lung (H)      Essential tremor      ETOH abuse      GERD (gastroesophageal reflux disease)      Hyperlipemia      Hypertension      Osteoporosis      S/P deep brain stimulator placement      Tremor of right hand     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

## 2021-05-28 ENCOUNTER — RECORDS - HEALTHEAST (OUTPATIENT)
Dept: ADMINISTRATIVE | Facility: CLINIC | Age: 86
End: 2021-05-28

## 2021-05-28 NOTE — PROGRESS NOTES
Office Visit - Follow up    Tyron Sharma   84 y.o. male    Date of Visit: 5/6/2019    Chief Complaint   Patient presents with     COPD     seems worse     Crohn's Disease     Hypertension       Subjective: COPD with history of Crohn's disease hypertension.    Recently seen by Dr. Avina.  Prednisone increased to 20 mg daily.  Given Z-Viraj.  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  Not on antibiotic now previously had been seen by pulmonary medicine specialist Dr. David Pulliam.    He does have allergies to ciprofloxacin as well as cephalexin.  The patient feels congested in his chest he has mucopurulent sputum.  There is no hemoptysis.  Medication list reviewed well-tolerated normal effects.  The patient had a recent chest x-ray done.    ROS: A comprehensive review of systems was performed and was otherwise negative    Medications:  Prior to Admission medications    Medication Sig Start Date End Date Taking? Authorizing Provider   albuterol (PROAIR HFA;PROVENTIL HFA;VENTOLIN HFA) 90 mcg/actuation inhaler Inhale 2 puffs every 6 (six) hours as needed for wheezing. 4/24/18  Yes Darrick Trammell MD   albuterol (PROVENTIL) 2.5 mg /3 mL (0.083 %) nebulizer solution Take 3 mL (2.5 mg total) by nebulization every 6 (six) hours as needed for wheezing. 11/14/18  Yes Rick Pulliam MD   artificial tears,hypromellose, (GENTEAL) 0.3 % Drop Administer 1 drop to both eyes 4 (four) times a day as needed (dry eye).   Yes PROVIDER, HISTORICAL   clindamycin (CLEOCIN T) 1 % lotion Apply 1 application topically 2 (two) times a day as needed. prn   Yes PROVIDER, HISTORICAL   D3/E/SE/SOY ISOFL/TOCOPH/LYCOP (PROSTATE 2.4 ORAL) Take 2 tablets by mouth daily.    Yes PROVIDER, HISTORICAL   diazePAM (VALIUM) 2 MG tablet Take 2 mg by mouth 2 (two) times a day.   Yes PROVIDER, HISTORICAL   diphenoxylate-atropine (LOMOTIL) 2.5-0.025 mg per tablet Take 1 tablet by mouth daily. 1/28/19  Yes Jp  Darrick CHE MD   finasteride (PROSCAR) 5 mg tablet Take 5 mg by mouth daily.   Yes PROVIDER, HISTORICAL   fluticasone (FLONASE) 50 mcg/actuation nasal spray 1 with each nostril twice daily.  Patient taking differently: 1 spray into each nostril 2 (two) times a day. 1 with each nostril twice daily. 4/15/16  Yes Darrick Trammell MD   fluticasone-umeclidinium-vilanterol (TRELEGY ELLIPTA) 100-62.5-25 mcg DsDv inhaler Inhale 1 Inhalation daily.   Yes PROVIDER, HISTORICAL   ketoconazole (NIZORAL) 2 % shampoo Apply 1 application topically as needed for itching. Apply to damp skin, lather, leave on 5 minutes, and rinse   Yes PROVIDER, HISTORICAL   lactobacillus rhamnosus GG (CULTURELLE) 10-15 Billion cell capsule Take 1 capsule by mouth daily.   Yes PROVIDER, HISTORICAL   omeprazole (PRILOSEC) 20 MG capsule TAKE 1 CAPSULE BY MOUTH ONCE DAILY 4/10/19  Yes Yonis Ray MD   predniSONE (DELTASONE) 20 MG tablet  4/9/19  Yes PROVIDER, HISTORICAL   saliva substitution combo no.9 (BIOTENE DRY MOUTH ORAL RINSE) Mwsh 5 mL by Mucous Membrane route as needed (dry mouth).    Yes PROVIDER, HISTORICAL   senna-docusate (PERICOLACE) 8.6-50 mg tablet Take 1 tablet by mouth 2 (two) times a day as needed for constipation.   Yes PROVIDER, HISTORICAL   tamsulosin (FLOMAX) 0.4 mg cap TAKE ONE CAPSULE(0.4 MG TOTAL) BY MOUTH ONCE DAILY AFTER SUPPER 2/26/19  Yes Darrick Trammell MD   traMADol (ULTRAM) 50 mg tablet TAKE 1 TABLET BY MOUTH EVERY 8 HOURS AS NEEDED FOR PAIN 4/10/19  Yes Yonis Ray MD   traZODone (DESYREL) 100 MG tablet TAKE ONE TABLET BY MOUTH ONCE DAILY AT BEDTIME 9/5/18  Yes Darrick Trammell MD   doxycycline (VIBRA-TABS) 100 MG tablet Take 1 tablet (100 mg total) by mouth 2 (two) times a day for 7 days. 5/6/19 5/13/19  Darrick Trammell MD   furosemide (LASIX) 20 MG tablet Take 1 tablet (20 mg total) by mouth daily for 7 days. 10/11/18 11/14/18  Darrick Trammell MD   ondansetron (ZOFRAN) 4 MG  tablet Take 1 tablet (4 mg total) by mouth daily as needed for nausea. 1/28/19   Darrick Trammell MD   psyllium with dextrose (METAMUCIL JAR) powder Take by mouth daily.    PROVIDER, HISTORICAL   predniSONE (DELTASONE) 5 MG tablet TAKE ONE TABLET BY MOUTH ONCE DAILY 3/19/19 5/6/19  Darrick Trammell MD   propranolol (INDERAL LA) 60 mg 24 hr capsule Take 180 mg by mouth. 4 talbets daily 8/31/18 5/6/19  PROVIDER, HISTORICAL       Allergies:   Allergies   Allergen Reactions     Ciprofloxacin Diarrhea and Nausea Only     Keflex [Cephalexin] Diarrhea and Nausea Only       Immunizations:   Immunization History   Administered Date(s) Administered     Influenza [id number], 12/30/2009     Influenza high dose, seasonal 12/23/2015, 10/02/2016, 10/02/2016, 10/26/2017, 09/27/2018     Influenza, inj, historic,unspecified 11/05/2007, 10/21/2008, 10/01/2010, 10/20/2016     Influenza,K2B2-05,Pandemic,split,IM 12/30/2009     Influenza,seasonal quad, PF, 36+MOS 10/23/2014, 12/23/2015     Influenza,seasonal, Inj IIV3 11/05/2007, 10/21/2008, 10/01/2010, 10/17/2011, 10/22/2013     Pneumo Conj 13-V (2010&after) 02/20/2015     Pneumo Polysac 23-V 07/15/2003     Td, Adult, Absorbed 10/21/2008     Td,adult,historic,unspecified 10/21/2008     ZOSTER, LIVE 06/17/2011       Exam Chest clear to auscultation and percussion.  Heart tones regular rhythm without murmur rub or gallop.  Abdomen soft nontender no organomegaly.  No peritoneal signs.  Extremities free of edema cyanosis or clubbing.  Neck veins nondistended no thyromegaly or scleral icterus noted, carotids full.  Skin warm and dry easily conversant good spirited.  Normal intelligence.  Neurologically intact no gross localizing findings.  Decreased breath sounds the patient previously had been a smoker but is no rales there is some rhonchi and depressed breath sounds neck veins are nondistended no leg edema abdomen mildly protuberant from centripetal obesity weight up her weight down I  should say 1 pound 191.    O2 sats 93% respiratory rate 22 and unlabored apical pulse 60 irregular.  Blood pressure 136/68.    Assessment and Plan  COPD with mild exacerbation.  Continue prednisone 20 mg daily may need pulmonary medicine reconsultation with Dr. Pullaim or Dr. Tommy Mcdaniels of [place] [facility].  Add doxycycline 100 mg twice daily for 7 days.    Multiple drug allergies including ciprofloxacin and cephalexin.    Hypertension controlled.  136/68.    Crohn's disease quiesced sent currently.    History of C. difficile toxin associated colitis currently asymptomatic no diarrhea.    Time: total time spent with the patient was 25 minutes of which >50% was spent in counseling and coordination of care    The following high BMI interventions were performed this visit: encouragement to exercise    Darrick Trammell MD    Patient Active Problem List   Diagnosis     Benign Prostatic Hypertrophy     Hyperlipidemia     Crohn's (Granulomatous) Colitis     Blindness of one eye     Essential tremor     COPD (chronic obstructive pulmonary disease) (H)     S/P total hip arthroplasty     Hip osteoarthritis     Rotator cuff tear, right     Postoperative pain     Dislocated elbow     Elbow dislocation     Essential hypertension with goal blood pressure less than 140/90     Crohn's disease without complication, unspecified gastrointestinal tract location (H)     GERD without esophagitis     Dislocation, elbow

## 2021-05-29 ENCOUNTER — RECORDS - HEALTHEAST (OUTPATIENT)
Dept: ADMINISTRATIVE | Facility: CLINIC | Age: 86
End: 2021-05-29

## 2021-05-29 NOTE — TELEPHONE ENCOUNTER
Controlled Substance Refill Request  Medication:   Requested Prescriptions     Pending Prescriptions Disp Refills     traMADol (ULTRAM) 50 mg tablet [Pharmacy Med Name: TRAMADOL 50MG TAB] 30 tablet 1     Sig: TAKE 1 TABLET BY MOUTH EVERY 8 HOURS AS NEEDED FOR PAIN     Date Last Fill: 4/10/19  Pharmacy: [facility]   Submit electronically to pharmacy    Controlled Substance Agreement on File:   Encounter-Level CSA Scan Date - 12/22/2016:    Scan on 12/22/2016 (below)         Last office visit with primary: 5/6/19

## 2021-05-29 NOTE — TELEPHONE ENCOUNTER
Tyron called for results of his CXR, informed him that there are no changes from the previous one.He also canceled his appointment for Monday.

## 2021-05-29 NOTE — PROGRESS NOTES
Oxygen saturation walk test    Patient oxygen saturation on RA at rest is 94%.  Oxygen saturation after ambulating 300ft on RA is 90%.      Patient is ambulatory within his/her home.

## 2021-05-29 NOTE — TELEPHONE ENCOUNTER
Needs emergency room evaluation and treatment.  Black stools could be from intestinal bleeding and/or Pepto-Bismol.  But for safety sake it would be best to go to the emergency room and be checked.  Please call patient and family for me.

## 2021-05-29 NOTE — TELEPHONE ENCOUNTER
Wife Lamar called to see if Pop could be seen today. Said he is having more shortness of breath, and coughing up yellow colored secretions. Will start COPD action plan and have him start Prednisone 40 mg x 5 days and a Z-pac.  Instructed to go to the Ed if symptoms get worse or do not improve. And to call on Friday with a update. Transferred to scheduling to make an appointment to be seen next week with Dr. Pulliam.

## 2021-05-29 NOTE — PROGRESS NOTES
Assessment and Plan:Tyron Sharma is a 84 y.o. M with a past medical history significant for COPD who presents to clinic today for a sick visit with a COPD exacerbation.  He recently completed a course of zithromax and is finishing 5 days of prednisone and feels somewhat better.  He also had a round of doxycyline and another course of prednisone before this.  He almost certainly has a viral bronchitis, and possibly a little pulmonary edema confounding his presentation.  He is struggling with controlling the mucous in his chest, and could stand better pulmonary toilet to help prevent mucous plugging.    1) Bronchitis - add sterile saline 0.9% to his nebs to do after albuterol to help liberate more mucous.  Should try to get a little exercise as possible to improve pulmonary excursion.  2) Pulmonary edema - try another 5 day course of lasix given the positive response this had before.  3) Cough - short course of tessalon to help abort coughing jegs that wake him up at times.  4) Persistence - I highly doubt he has a high level antibiotic resistant organism, and he has a history of C. Diff so I would like to avoid any more antibiotic trials.  Will rule out a pneumonia with a CXR, as this would be the only clear reason to represcribe an antibiotic at this point.  5) RTC in 1 week to check in.  If he is feeling better he will call to cancel this appointment      CCx: copd exacerbation    HPI: Mr. Sharma is a 84 year old male with a history of COPD with relatively preserved PFTs who presents for an exacerbation.  He has tried two courses of prednisone and antibiotics and only feels a little better.  He is coughing copious amounts of yellow sputum out, and this sometimes wakes him up.  He is using his nebulizer 3 times a day, and remains on trelegy.  He did try a short course of lasix in April, which he thinks helps.  He is gaining weight on the prednisone and has swelling in his ankles.  He has no fever or chills.   He has been very short of breath with ambulation.    ROS:  Review of Systems - History obtained from the patient  General ROS: negative  Psychological ROS: negative  ENT ROS: negative  Allergy and Immunology ROS: negative  Endocrine ROS: negative  Respiratory ROS: positive for - cough, shortness of breath, sputum changes and tachypnea  negative for - hemoptysis or orthopnea  Cardiovascular ROS: no chest pain or palpitations - worsening swelling in ankles  Gastrointestinal ROS: no abdominal pain, change in bowel habits, or black or bloody stools  Genito-Urinary ROS: no dysuria, trouble voiding, or hematuria  Musculoskeletal ROS: negative  Neurological ROS: no TIA or stroke symptoms  Dermatological ROS: negative      Current Meds:  Current Outpatient Medications   Medication Sig     albuterol (PROAIR HFA;PROVENTIL HFA;VENTOLIN HFA) 90 mcg/actuation inhaler Inhale 2 puffs every 6 (six) hours as needed for wheezing.     albuterol (PROVENTIL) 2.5 mg /3 mL (0.083 %) nebulizer solution Take 3 mL (2.5 mg total) by nebulization every 6 (six) hours as needed for wheezing.     artificial tears,hypromellose, (GENTEAL) 0.3 % Drop Administer 1 drop to both eyes 4 (four) times a day as needed (dry eye).     clindamycin (CLEOCIN T) 1 % lotion Apply 1 application topically 2 (two) times a day as needed. prn     D3/E/SE/SOY ISOFL/TOCOPH/LYCOP (PROSTATE 2.4 ORAL) Take 2 tablets by mouth daily.      diazePAM (VALIUM) 2 MG tablet Take 2 mg by mouth 2 (two) times a day.     diphenoxylate-atropine (LOMOTIL) 2.5-0.025 mg per tablet Take 1 tablet by mouth daily.     finasteride (PROSCAR) 5 mg tablet Take 5 mg by mouth daily.     fluticasone (FLONASE) 50 mcg/actuation nasal spray 1 with each nostril twice daily. (Patient taking differently: 1 spray into each nostril 2 (two) times a day. 1 with each nostril twice daily.)     fluticasone-umeclidinium-vilanterol (TRELEGY ELLIPTA) 100-62.5-25 mcg DsDv inhaler Inhale 1 Inhalation daily.      furosemide (LASIX) 20 MG tablet Take 1 tablet (20 mg total) by mouth daily for 7 days.     ketoconazole (NIZORAL) 2 % shampoo Apply 1 application topically as needed for itching. Apply to damp skin, lather, leave on 5 minutes, and rinse     lactobacillus rhamnosus GG (CULTURELLE) 10-15 Billion cell capsule Take 1 capsule by mouth daily.     omeprazole (PRILOSEC) 20 MG capsule TAKE 1 CAPSULE BY MOUTH ONCE DAILY     ondansetron (ZOFRAN) 4 MG tablet Take 1 tablet (4 mg total) by mouth daily as needed for nausea.     predniSONE (DELTASONE) 20 MG tablet      predniSONE (DELTASONE) 20 MG tablet Take 40 mg by mouth daily for 5 days.     psyllium with dextrose (METAMUCIL JAR) powder Take by mouth daily.     saliva substitution combo no.9 (BIOTENE DRY MOUTH ORAL RINSE) Mwsh 5 mL by Mucous Membrane route as needed (dry mouth).      senna-docusate (PERICOLACE) 8.6-50 mg tablet Take 1 tablet by mouth 2 (two) times a day as needed for constipation.     tamsulosin (FLOMAX) 0.4 mg cap TAKE ONE CAPSULE(0.4 MG TOTAL) BY MOUTH ONCE DAILY AFTER SUPPER     traMADol (ULTRAM) 50 mg tablet TAKE 1 TABLET BY MOUTH EVERY 8 HOURS AS NEEDED FOR PAIN     traZODone (DESYREL) 100 MG tablet TAKE ONE TABLET BY MOUTH ONCE DAILY AT BEDTIME       Labs:  No results found for this or any previous visit (from the past 72 hour(s)).    I have personally reviewed all pertinent imaging studies and PFT results unless otherwise noted.    Imaging studies:  Xr Elbow Right 2 Vws Portable    Result Date: 1/12/2017  XR ELBOW RIGHT 2 VWS PORTABLE 1/12/2017 2:17 PM INDICATION: Follow-up repair. External fixator removal.    COMPARISON: 11/10/2016 FINDINGS: 45 seconds of fluoroscopic time provided during the procedure. AP, oblique and lateral views obtained with the external fixator in place and then with the external fixator removed.    Xr C Arm Greater Than One Hour    Result Date: 1/12/2017  Please see the Radiology Report for result for body part of interest.          Physical Exam:  Wt 195 lb 6.4 oz (88.6 kg)   BMI 29.71 kg/m    General - Well nourished, obese  Ears/Mouth -  OP pink moist, no thrush  Neck - no cervical lymphadenopathy  Lungs - Rhonchorous thorughout  CVS - regular rhythm with no murmurs, rubs or gallups  Abdomen - soft, NT, ND, NABS  Ext - no cyanosis, clubbing 1+ edema in his ankles  Skin - no rash  Psychology - alert and oriented, answers appropriate        Electronically signed by:    Rick Pulliam MD PhD  [facility]

## 2021-05-29 NOTE — PATIENT INSTRUCTIONS - HE
1) I think you have a bad bronchitis  2) Im checking a chest X ray to rule out pneumonia  3) I'm also giving you a short course of lasix to help get fluid out  4) We will try saline nebs to help you cough out the mucous  5) I will taper the prednisone down so you don't go off cold turkey

## 2021-05-29 NOTE — TELEPHONE ENCOUNTER
Upset stomach for the last couple weeks and has real dark black stool per wife  No vomiting noted  No appetite and some nausea  He has had one pepto bismol and a couple doses yesterday   No severe abdominal pain  More abdominal pain in the lower abdomen and can last for awhile for a couple weeks  Hx of chron's disease  He cannot have MRI   He took pepto bismol so black stools   Pt is getting around the house   20 % more tired than usual  abd pain not at this time  He does have diarrhea that he has had off and on for the last couple weeks  He is urinating ok    ER or Appt?    Cont to monitor?    Reason for Disposition    Bloody, black, or tarry bowel movements    Protocols used: RECTAL BLEEDING-A-OH

## 2021-05-30 ENCOUNTER — RECORDS - HEALTHEAST (OUTPATIENT)
Dept: ADMINISTRATIVE | Facility: CLINIC | Age: 86
End: 2021-05-30

## 2021-05-30 VITALS — BODY MASS INDEX: 25.16 KG/M2 | HEIGHT: 68 IN | WEIGHT: 166 LBS

## 2021-05-30 VITALS — WEIGHT: 175 LBS | HEIGHT: 68 IN | BODY MASS INDEX: 26.52 KG/M2

## 2021-05-30 VITALS — BODY MASS INDEX: 24.25 KG/M2 | HEIGHT: 68 IN | WEIGHT: 160 LBS

## 2021-05-31 ENCOUNTER — RECORDS - HEALTHEAST (OUTPATIENT)
Dept: ADMINISTRATIVE | Facility: CLINIC | Age: 86
End: 2021-05-31

## 2021-05-31 VITALS — BODY MASS INDEX: 27.78 KG/M2 | HEIGHT: 68 IN | WEIGHT: 183.3 LBS

## 2021-05-31 VITALS — WEIGHT: 184 LBS | HEIGHT: 68 IN | BODY MASS INDEX: 27.89 KG/M2

## 2021-05-31 VITALS — WEIGHT: 184 LBS | BODY MASS INDEX: 27.98 KG/M2

## 2021-05-31 VITALS — BODY MASS INDEX: 26.67 KG/M2 | HEIGHT: 68 IN | WEIGHT: 176 LBS

## 2021-05-31 VITALS — BODY MASS INDEX: 27.43 KG/M2 | HEIGHT: 68 IN | WEIGHT: 181 LBS

## 2021-05-31 VITALS — WEIGHT: 182 LBS | BODY MASS INDEX: 27.58 KG/M2 | HEIGHT: 68 IN

## 2021-05-31 VITALS — WEIGHT: 178 LBS | BODY MASS INDEX: 27.06 KG/M2

## 2021-05-31 NOTE — TELEPHONE ENCOUNTER
Who is calling:  Patients Son    Reason for Call:  Patient would like Dr. Trammell or someone on the care team to contact patients wife Lamar and go over possible options moving forward for patient being that he just had surgery and is at [facility] ER. Son is wondering whether or not he  would need to be placed in Hospice. Please call patients mother as soon as able to follow up.    Okay to leave a detailed message: Yes

## 2021-05-31 NOTE — TELEPHONE ENCOUNTER
Called Lamar- advised PCP not in the office today- she is taking patient to [facility] shortly- just FYI.     Discharge Instructions  - documented in this encounter  Instructions  David Santamaria,  - 08/06/2019    Follow-up with your primary care doctor as soon as possible, preferably in the next day. Return to emergency department with stool specimen for testing of C diff. Start taking antibiotic as directed in the meantime.

## 2021-05-31 NOTE — PROGRESS NOTES
Office Visit - Follow up    Tyron Sharma   84 y.o. male    Date of Visit: 8/6/2019    Chief Complaint   Patient presents with     Hospital Visit Follow Up     Acute exacerbation of COPD  pneumonia       Subjective: COPD with pneumonia.  Recent hospital stay 6 days in [facility].  Acute exacerbation of COPD.    Pneumonia right lower lobe discharge summary reviewed.    Patient has watery stools not nocturnal.  Previous history of C. difficile toxin associated diarrhea will treat empirically.  Patient not interested in producing a stool specimen.  There is no blood in stool no blood in the urine no hemoptysis.    Accompanied today by his son and wife they are very supportive.    Denies chest pain substernal but lateral bilateral chest pain from intercostal muscle coughing and straining noted.  The patient is not short of breath and not febrile.  Uses supplemental oxygen now.  Prior history of Crohn's disease with out progression.    ROS: A comprehensive review of systems was performed and was otherwise negative    Medications:  Prior to Admission medications    Medication Sig Start Date End Date Taking? Authorizing Provider   albuterol (PROAIR HFA;PROVENTIL HFA;VENTOLIN HFA) 90 mcg/actuation inhaler Inhale 2 puffs every 6 (six) hours as needed for wheezing. 4/24/18  Yes Darrick Trammell MD   albuterol (PROVENTIL) 2.5 mg /3 mL (0.083 %) nebulizer solution Take 3 mL (2.5 mg total) by nebulization every 6 (six) hours as needed for wheezing. 11/14/18  Yes Rick Pulliam MD   clindamycin (CLEOCIN T) 1 % lotion Apply 1 application topically 2 (two) times a day as needed. prn   Yes PROVIDER, HISTORICAL   D3/E/SE/SOY ISOFL/TOCOPH/LYCOP (PROSTATE 2.4 ORAL) Take 2 tablets by mouth daily.    Yes PROVIDER, HISTORICAL   diazePAM (VALIUM) 2 MG tablet Take 2 mg by mouth 2 (two) times a day.   Yes PROVIDER, HISTORICAL   finasteride (PROSCAR) 5 mg tablet Take 5 mg by mouth daily.   Yes PROVIDER, HISTORICAL    fluticasone (FLONASE) 50 mcg/actuation nasal spray 1 with each nostril twice daily.  Patient taking differently: 1 spray into each nostril 2 (two) times a day. 1 with each nostril twice daily. 4/15/16  Yes Darrick Trammell MD   fluticasone-umeclidinium-vilanterol (TRELEGY ELLIPTA) 100-62.5-25 mcg DsDv inhaler Inhale 1 puff. 7/26/18  Yes PROVIDER, HISTORICAL   lactobacillus rhamnosus GG (CULTURELLE) 10-15 Billion cell capsule Take 1 capsule by mouth daily.   Yes PROVIDER, HISTORICAL   omeprazole (PRILOSEC) 20 MG capsule TAKE 1 CAPSULE BY MOUTH ONCE DAILY 4/10/19  Yes Yonis Ray MD   propranolol (INDERAL LA) 60 mg 24 hr capsule TAKE 3 CAPSULES BY MOUTH ONCE DAILY 7/22/19  Yes PROVIDER, HISTORICAL   tamsulosin (FLOMAX) 0.4 mg cap TAKE ONE CAPSULE(0.4 MG TOTAL) BY MOUTH ONCE DAILY AFTER SUPPER 2/26/19  Yes Darrick Trammell MD   traMADol (ULTRAM) 50 mg tablet TAKE 1 TABLET BY MOUTH EVERY 8 HOURS AS NEEDED FOR PAIN 7/16/19  Yes Darrick Trammell MD   traZODone (DESYREL) 100 MG tablet TAKE 1 TABLET BY MOUTH ONCE DAILY AT BEDTIME 7/16/19  Yes Darrick Trammell MD   artificial tears,hypromellose, (GENTEAL) 0.3 % Drop Administer 1 drop to both eyes 4 (four) times a day as needed (dry eye).    PROVIDER, HISTORICAL   diphenoxylate-atropine (LOMOTIL) 2.5-0.025 mg per tablet Take 1 tablet by mouth daily. 1/28/19   Darrick Trammell MD   furosemide (LASIX) 20 MG tablet Take 1 tablet (20 mg total) by mouth daily for 7 days. 10/11/18 11/14/18  Darrick Trammell MD   furosemide (LASIX) 40 MG tablet Take 1 tablet (40 mg total) by mouth daily for 5 days. 6/3/19 6/8/19  Rick Pulliam MD   ketoconazole (NIZORAL) 2 % shampoo Apply 1 application topically as needed for itching. Apply to damp skin, lather, leave on 5 minutes, and rinse    PROVIDER, HISTORICAL   metroNIDAZOLE (FLAGYL) 500 MG tablet Take 1 tablet (500 mg total) by mouth 3 (three) times a day for 14 days. 8/6/19 8/20/19  Jp  Darrick CHE MD   ondansetron (ZOFRAN) 4 MG tablet Take 1 tablet (4 mg total) by mouth daily as needed for nausea. 1/28/19   Darrick Trammell MD   psyllium with dextrose (METAMUCIL JAR) powder Take by mouth daily.    PROVIDER, HISTORICAL   saliva substitution combo no.9 (BIOTENE DRY MOUTH ORAL RINSE) Mwsh 5 mL by Mucous Membrane route as needed (dry mouth).     PROVIDER, HISTORICAL   senna-docusate (PERICOLACE) 8.6-50 mg tablet Take 1 tablet by mouth 2 (two) times a day as needed for constipation.    PROVIDER, HISTORICAL   benzonatate (TESSALON PERLES) 100 MG capsule Take 1 capsule (100 mg total) by mouth 3 (three) times a day as needed for cough. 6/3/19 8/6/19  Rick Pulliam MD       Allergies:   Allergies   Allergen Reactions     Ciprofloxacin Diarrhea and Nausea Only     Keflex [Cephalexin] Diarrhea and Nausea Only       Immunizations:   Immunization History   Administered Date(s) Administered     Influenza [id number], 12/30/2009     Influenza high dose, seasonal 12/23/2015, 10/02/2016, 10/02/2016, 10/26/2017, 09/27/2018     Influenza, inj, historic,unspecified 11/05/2007, 10/21/2008, 10/01/2010, 10/20/2016     Influenza,Y2H3-13,Pandemic,split,IM 12/30/2009     Influenza,seasonal quad, PF, 36+MOS 10/23/2014, 12/23/2015     Influenza,seasonal, Inj IIV3 11/05/2007, 10/21/2008, 10/01/2010, 10/17/2011, 10/22/2013     Pneumo Conj 13-V (2010&after) 02/20/2015     Pneumo Polysac 23-V 07/15/2003     Td, Adult, Absorbed 10/21/2008     Td,adult,historic,unspecified 10/21/2008     ZOSTER, LIVE 06/17/2011       Exam Chest clear to auscultation and percussion.  Heart tones regular rhythm without murmur rub or gallop.  Abdomen soft nontender no organomegaly.  No peritoneal signs.  Extremities free of edema cyanosis or clubbing.  Neck veins nondistended no thyromegaly or scleral icterus noted, carotids full.  Skin warm and dry easily conversant good spirited.  Normal intelligence.  Neurologically intact no gross  localizing findings.  Abdominal distention is noted bowel sounds are present but hypoactive.  BMI 28.28 weight down 9 pounds pulse 84 regular O2 sats on nasal cannula 2 L/min oxygen measured 98% blood pressure excellent 112/60.    Assessment and Plan  COPD with right-sided pneumonia and exacerbation.  Check hemogram plus conference of metabolic profile.    C. difficile toxin associated colitis empiric treatment for same with metronidazole 500 mg 3 times a day for 14 days.    Centripetal obesity with abdominal distention may be a manifestation of pseudomembranous colitis related to C. difficile toxin.    Crohn's disease by history also contributing.    Hypertension controlled.    Chronic pain syndrome continue same meds and cares RTC 1 month.  May need steroids for COPD.    Time: total time spent with the patient was 40 minutes of which >50% was spent in counseling and coordination of care    The following high BMI interventions were performed this visit: encouragement to exercise    Darrick Trammell MD    Patient Active Problem List   Diagnosis     Benign Prostatic Hypertrophy     Hyperlipidemia     Crohn's (Granulomatous) Colitis     Blindness of one eye     Essential tremor     COPD (chronic obstructive pulmonary disease) (H)     S/P total hip arthroplasty     Hip osteoarthritis     Rotator cuff tear, right     Postoperative pain     Dislocated elbow     Elbow dislocation     Essential hypertension with goal blood pressure less than 140/90     Crohn's disease without complication, unspecified gastrointestinal tract location (H)     GERD without esophagitis     Dislocation, elbow

## 2021-05-31 NOTE — TELEPHONE ENCOUNTER
Who is calling:  [facility]  Reason for Call:  Patient is being treated with vancomycin for C-dif and Augmentin 875 for pneumonia.  He is having frequent loose stools during the day, hard to make it to the bathroom on time.  He has been adding starches and banans to his diet.  Patient was also prescribed Flagyl but has not started it.  Should he be taking it given his current loose stools from C-dif?  Any other recommendations to decrease frequency of loose stools.    Date of last appointment with primary care: 8/6/19  Okay to leave a detailed message: Yes

## 2021-05-31 NOTE — TELEPHONE ENCOUNTER
"  Call from wife      Status update       Recently completed courses of ABX        concerned about possible CDif  > \"loose stool\"   > Stool has dark appearance - not bloody though   > Appetite down - giving Ensure as able      > No fevers   > Does have rash on buttocks - stools   > No vomting       Is ok with 2L O2 at home - will become short of breath if unteathered from it though        A/P:   > Will note update for provider visit tomorrow - usual PCP does not have openings today to see him sooner than appt tomorrow       > Will note concern re  CDif to discuss further with Jp       > Cont with at home care as directed     > Call back sooner if any issues before appt       Mickey Cisneros, RN   Triage and Medication Refills    "

## 2021-05-31 NOTE — TELEPHONE ENCOUNTER
CA was given call from Catina GERARDO below stating pt was going to hospital.  She states for some reason pt had called hospital and was advised he needed admit order.  She informed him that he should go to ER as advised and they will see and evaluate him.  CA was informed patient IS going to ER as advised.  CA was informed pt wife wanted tests sent but there is info as to who or where these would go.  CA tried to reach pt and got a VM CA advised on message pt should definitely go to ER to be seen and evaluated and that Dr. Trammell would not advise this if it was not emergent and important.  CA also stated that pt should be able to sign permission for any needed test results when he gets theres with Care Everywhere.  CA called Hospital at number provided and was informed by ER nurse (Ursula).  Who spoke with pt earlier and she confirmed that pt should come and would definitely be seen and evaluated.  Liss Garza CMA ([facility]) 4:50 PM

## 2021-05-31 NOTE — TELEPHONE ENCOUNTER
I would need more information.  Is this recurrent C. difficile, was he quite ill with this?  Who prescribed the vancomycin?  Who prescribed metronidazole?  Generally treat initial episode of C. difficile diarrhea with vancomycin 125 mg 4 times a day for 7 to 14 days

## 2021-05-31 NOTE — TELEPHONE ENCOUNTER
Spoke with Lamar and relayed message below from Dr. Trammell.  Lamar states that they both have appointments with Dr. Trammell tomorrow.  She had no further questions at this time.  Alyx IVY CMA/VANDANA....................5:00 PM

## 2021-05-31 NOTE — TELEPHONE ENCOUNTER
Orders being requested: Admit orders  Reason service is needed/diagnosis: Patient was advised to go to ED in [place] for an elevated WBC. Patient called and was told he needed admit orders sent from Dr. Trammell  When are orders needed by: ASAP  Where to send Orders: Phone:  [phone]  Okay to leave detailed message?  Yes    Notes recorded by Darrick Trammell MD on 8/6/2019 at 3:35 PM CDT  Called and spoke with patient and discussed the elevated white blood count.  This may be due to prednisone which she is on from his pulmonary medicine specialist for COPD exacerbation with right chest pneumonia.  However with abdominal distention and diarrhea this elevated white blood count may represent an acute abdominal process requiring emergency room evaluation and treatment and I suggested the emergency department closest to him in [facility] the city of his residence.  Patient understands and will seek out emergency room evaluation at this time.  ------    Notes recorded by Darrick Trammell MD on 8/6/2019 at 3:34 PM CDT  Spoke with patient and his wife and discussed the elevated white count of nearly 22,000 and I advised emergency room evaluation at the closest hospital.  The patient and his wife live in [facility] [place].  The patient also is on prednisone per his pulmonologist and this may have an effect on elevating the white count discussed in detail with the patient.  May ultimately require imaging studies like CT scan chest abdomen and pelvis.  Patient is recovering from COPD expect exacerbation with pneumonia right chest.

## 2021-05-31 NOTE — TELEPHONE ENCOUNTER
FYI - Status Update  Who is Calling: Spouse Lamar  Update: Caller is calling in to updated  shalinider about  patient passed away on 8/23/19.   Okay to leave a detailed message?:  No

## 2021-05-31 NOTE — TELEPHONE ENCOUNTER
Called and spoke to Earlene- advised we need more info as requested by Dr. Burnett- she states she is with a patient right now and will call back    **Please get the info that is being requested when she calls back

## 2021-05-31 NOTE — TELEPHONE ENCOUNTER
Needs office visit please w/ either myself or one of my colleagues at the [facility] and thanks

## 2021-06-01 VITALS — BODY MASS INDEX: 29.94 KG/M2 | WEIGHT: 196.9 LBS

## 2021-06-01 VITALS — HEIGHT: 68 IN | WEIGHT: 187 LBS | BODY MASS INDEX: 28.34 KG/M2

## 2021-06-01 VITALS — WEIGHT: 199 LBS | BODY MASS INDEX: 30.16 KG/M2 | HEIGHT: 68 IN

## 2021-06-01 VITALS — BODY MASS INDEX: 27.83 KG/M2 | WEIGHT: 183 LBS

## 2021-06-01 NOTE — TELEPHONE ENCOUNTER
FYI - Status Update  Who is Calling: Spouse  Update The patient wife would like you to know the patient is  since 19    Okay to leave a detailed message?:  No return call needed

## 2021-06-02 ENCOUNTER — RECORDS - HEALTHEAST (OUTPATIENT)
Dept: ADMINISTRATIVE | Facility: CLINIC | Age: 86
End: 2021-06-02

## 2021-06-02 VITALS — BODY MASS INDEX: 29.1 KG/M2 | HEIGHT: 68 IN | WEIGHT: 192 LBS

## 2021-06-02 VITALS — WEIGHT: 189 LBS | BODY MASS INDEX: 28.64 KG/M2 | HEIGHT: 68 IN

## 2021-06-02 VITALS — HEIGHT: 68 IN | WEIGHT: 189 LBS | BODY MASS INDEX: 28.64 KG/M2

## 2021-06-02 VITALS — HEIGHT: 68 IN | BODY MASS INDEX: 28.95 KG/M2 | WEIGHT: 191 LBS

## 2021-06-02 VITALS — WEIGHT: 192 LBS | BODY MASS INDEX: 29.1 KG/M2 | HEIGHT: 68 IN

## 2021-06-02 VITALS — WEIGHT: 188.2 LBS | BODY MASS INDEX: 28.62 KG/M2

## 2021-06-03 VITALS — WEIGHT: 191 LBS | BODY MASS INDEX: 28.95 KG/M2 | HEIGHT: 68 IN

## 2021-06-03 VITALS — HEIGHT: 68 IN | BODY MASS INDEX: 28.19 KG/M2 | WEIGHT: 186 LBS

## 2021-06-03 VITALS — BODY MASS INDEX: 29.71 KG/M2 | WEIGHT: 195.4 LBS

## 2021-06-08 NOTE — ANESTHESIA PREPROCEDURE EVALUATION
Anesthesia Evaluation      Patient summary reviewed   No history of anesthetic complications     Airway   Mallampati: I  Neck ROM: full   Pulmonary - normal exam   (+) COPD,                          Cardiovascular - normal exam  Exercise tolerance: > or = 4 METS  (+) hypertension well controlled, ,     (-) murmur  Rhythm: regular  Rate: normal,    no murmur      Neuro/Psych      Endo/Other    (+) arthritis,      GI/Hepatic/Renal    (+) GERD well controlled and intermittent,             Dental - normal exam                        Anesthesia Plan  Planned anesthetic: general LMA    ASA 3   Induction: intravenous   Anesthetic plan and risks discussed with: spouse and patient    Post-op plan: routine recovery

## 2021-06-08 NOTE — ANESTHESIA POSTPROCEDURE EVALUATION
Patient: Tyron Sharma  2)  RIGHT ELBOW EXTERNAL FIXATOR REMOVAL  Anesthesia type: general    Patient location: PACU  Last vitals:   Vitals:    01/12/17 1500   BP: 108/59   Pulse: 91   Resp: 14   Temp:    SpO2: 94%     Post vital signs: stable  Level of consciousness: awake and responds to simple questions  Post-anesthesia pain: pain controlled  Post-anesthesia nausea and vomiting: no  Pulmonary: unassisted, return to baseline  Cardiovascular: stable and blood pressure at baseline  Hydration: adequate  Anesthetic events: no    QCDR Measures:  ASA# 11 - Amanda-op Cardiac Arrest: ASA11B - Patient did NOT experience unanticipated cardiac arrest  ASA# 12 - Amanda-op Mortality Rate: ASA12B - Patient did NOT die  ASA# 13 - PACU Re-Intubation Rate: ASA13B - Patient did NOT require a new airway mgmt  ASA# 10 - Composite Anes Safety: ASA10A - No serious adverse event  ASA# 38 - New Corneal Injury: ASA38A - No new exposure keratitis or corneal abrasion in PACU    Additional Notes:

## 2021-06-08 NOTE — PROGRESS NOTES
Office Visit - Follow up    Tyron Sharma   82 y.o. male    Date of Visit: 1/26/2017    Chief Complaint   Patient presents with     Hypertension     Tremors     Follow-up     right elbow surgery     Medication Questions     Prostate 2.4     Insomnia     Trazodone not effective       Subjective: Crohn's disease.  Follow-up right elbow surgery doing well seen by orthopedist.  History of hypertension plus essential tremor.  Trazodone is not helping sleeping.  Discussed melatonin 3 or 5 mg over-the-counter as a supplement to take with trazodone.  Patient's appetite is good he does eat more than 1000 madhu per day he appears stronger.  Wants to stop some of this might his supplemental medications or over-the-counter I suggested stopping multivitamin first plus prostate 2.4 uses tamsulosin for symptoms of prostatism associated with BPH.    Patient's last colonoscopy dated March 22, 2013 showed some inflammatory reaction but no polyps or masses diverticulosis was seen the patient is followed at the [facility] GI department doctor MAURA.    Other past health history reviewed and complex.    No blood in stool or urine no chest pain or shortness of breath appears stronger he is attended today by his wife a registered nurse now retired she concurs.    Medication list reviewed a generally well-tolerated.    ROS: A comprehensive review of systems was performed and was otherwise negative    Medications:  Prior to Admission medications    Medication Sig Start Date End Date Taking? Authorizing Provider   acetaminophen (TYLENOL) 325 MG tablet Take 650 mg by mouth every 4 (four) hours as needed for pain.   Yes PROVIDER, HISTORICAL   artificial tears,hypromellose, (GENTEAL) 0.3 % Drop Administer 1 drop to both eyes 4 (four) times a day as needed (dry eye).   Yes PROVIDER, HISTORICAL   clindamycin (CLEOCIN T) 1 % lotion Apply 1 application topically 2 (two) times a day as needed. prn   Yes PROVIDER, HISTORICAL   D3/E/SE/SOY  ISOFL/TOCOPH/LYCOP (PROSTATE 2.4 ORAL) Take 2 tablets by mouth daily.    Yes PROVIDER, HISTORICAL   diazepam (VALIUM) 5 MG tablet Take 1 tablet (5 mg total) by mouth 4 (four) times a day.  Patient taking differently: Take 5 mg by mouth 3 (three) times a day.  5/25/16  Yes Joel Pa MD   fluticasone (FLONASE) 50 mcg/actuation nasal spray 1 with each nostril twice daily.  Patient taking differently: 1 spray into each nostril daily. 1 with each nostril twice daily. 4/15/16  Yes Darrick Trammell MD   fluticasone-salmeterol (ADVAIR) 500-50 mcg/dose DISKUS Inhale 1 puff 2 (two) times a day. 4/15/16  Yes Darrick Trammell MD   ketoconazole (NIZORAL) 2 % shampoo Apply 1 application topically as needed for itching. Apply to damp skin, lather, leave on 5 minutes, and rinse   Yes PROVIDER, HISTORICAL   lactobacillus rhamnosus GG (CULTURELLE) 10-15 Billion cell capsule Take 1 capsule by mouth daily.   Yes PROVIDER, HISTORICAL   omeprazole (PRILOSEC) 20 MG capsule Take 1 capsule (20 mg total) by mouth daily. 4/15/16  Yes Darrick Trammell MD   predniSONE (DELTASONE) 5 MG tablet Take 5 mg by mouth daily.   Yes PROVIDER, HISTORICAL   saliva substitution combo no.9 (BIOTENE DRY MOUTH ORAL RINSE) Mwsh 5 mL by Mucous Membrane route as needed (dry mouth).    Yes PROVIDER, HISTORICAL   senna-docusate (PERICOLACE) 8.6-50 mg tablet Take 1 tablet by mouth 2 (two) times a day as needed for constipation.   Yes PROVIDER, HISTORICAL   traMADol (ULTRAM) 50 mg tablet Take 1 tablet (50 mg total) by mouth daily. Take one tablet daily for pain 1/16/17  Yes Darrick Trammell MD   traZODone (DESYREL) 100 MG tablet Take 1 tablet (100 mg total) by mouth bedtime. 12/21/16  Yes Darrick Trammell MD   tamsulosin (FLOMAX) 0.4 mg Cp24 Take 1 capsule (0.4 mg total) by mouth daily after supper. 10/24/16   Darrick Trammell MD       Allergies:   Allergies   Allergen Reactions     Ciprofloxacin Diarrhea and Nausea Only     Keflex  [Cephalexin] Diarrhea and Nausea Only       Immunizations:   Immunization History   Administered Date(s) Administered     Influenza high dose, seasonal 12/23/2015     Influenza, inj, historic 11/05/2007, 10/21/2008, 10/01/2010, 10/03/2016     Pneumo Conj 13-V (2010&after) 02/20/2015     Pneumo Polysac 23-V 07/15/2003     Td, historic 10/21/2008     ZOSTER 06/17/2011       Exam Chest clear to auscultation and percussion.  Heart tones regular rhythm without murmur rub or gallop.  Abdomen soft nontender no organomegaly.  No peritoneal signs.  Extremities free of edema cyanosis or clubbing.  Neck veins nondistended no thyromegaly or scleral icterus noted, carotids full.  Skin warm and dry easily conversant good spirited.  Normal intelligence.  Neurologically intact no gross localizing findings.    Assessment and Plan   Crohn's disease with recent right elbow surgery hypertension and essential tremor.    Diverticulosis on last colonoscopy seen 3/22/13 with mild inflammation.  Allergies ciprofloxacin and Keflex.    Insomnia suggest melatonin OTC with continuation of trazodone 50 mg daily.    Prostatism with BPH continue tamsulosin whole prostate 2.4 a supplement plus multivitamin.  Patient describes his caloric intake is greater than 1000 madhu per day I emphasized 5 servings of fruits or vegetables daily as well.  RTC 1 month    Time: total time spent with the patient was 25 minutes of which >50% was spent in counseling and coordination of care    The following high BMI interventions were performed this visit: encouragement to exercise    Darrick Trammell MD    Patient Active Problem List   Diagnosis     Benign Prostatic Hypertrophy     Hyperlipidemia     Crohn's (Granulomatous) Colitis     Blindness of one eye     Essential tremor     COPD (chronic obstructive pulmonary disease)     S/P total hip arthroplasty     Hip osteoarthritis     Rotator cuff tear, right     Postoperative pain     Dislocated elbow     Elbow  dislocation     Essential hypertension with goal blood pressure less than 140/90     Crohn's disease without complication, unspecified gastrointestinal tract location     GERD without esophagitis     Dislocation, elbow

## 2021-06-08 NOTE — ANESTHESIA CARE TRANSFER NOTE
Last vitals:   Vitals:    01/12/17 1402   BP: 112/60   Pulse: (!) 109   Resp: 14   Temp: 36.9  C (98.4  F)   SpO2: 98%     Patient's level of consciousness is drowsy  Spontaneous respirations: yes  Maintains airway independently: yes  Dentition unchanged: yes  Oropharynx: oropharynx clear of all foreign objects    QCDR Measures:  ASA# 20 - Surgical Safety Checklist: ASA20A - Safety Checks Done  PQRS# 430 - Adult PONV Prevention: 4558F - Pt received => 2 anti-emetic agents (different classes) preop & intraop  ASA# 8 - Peds PONV Prevention: NA - Not pediatric patient, not GA or 2 or more risk factors NOT present  PQRS# 424 - Amanda-op Temp Management: 4559F - At least one body temp DOCUMENTED => 35.5C or 95.9F within required timeframe  PQRS# 426 - PACU Transfer Protocol: - Transfer of care checklist used  ASA# 14 - Acute Post-op Pain: ASA14B - Patient did NOT experience pain >= 7 out of 10    I completed my SBAR handoff to the receiving nurse per policy and procedure.

## 2021-06-09 NOTE — PROGRESS NOTES
Office Visit - Physical    Tyron Sharma   82 y.o. male    Date of Visit: 3/23/2017    Chief Complaint   Patient presents with     Pre-op Exam     Upper GI 3/24/17 @ [facility] by Dr Graham       Subjective: Preoperative examination for anticipated esophagogastroduodenoscopy with possible dilatation of esophageal stricture at March 24, 2017.    82-year-old male with history of Crohn's disease and choking.  No weight loss dysphagia has been for some period of time.  He sometimes evidence of vomit up food particles.  No hematemesis long history of GERD or acid reflux on Nexium.  Discussed no melena.  No weight loss.  No anorexia.    ROS: A comprehensive review of systems was performed and was otherwise negative    Medications:   Prior to Admission medications    Medication Sig Start Date End Date Taking? Authorizing Provider   acetaminophen (TYLENOL) 500 MG tablet Take 100 mg by mouth 2 (two) times a day.   Yes PROVIDER, HISTORICAL   ADVAIR DISKUS 500-50 mcg/dose DISKUS INHALE ONE PUFF BY MOUTH TWICE DAILY 3/18/17  Yes Darrick Trammell MD   artificial tears,hypromellose, (GENTEAL) 0.3 % Drop Administer 1 drop to both eyes 4 (four) times a day as needed (dry eye).   Yes PROVIDER, HISTORICAL   clindamycin (CLEOCIN T) 1 % lotion Apply 1 application topically 2 (two) times a day as needed. prn   Yes PROVIDER, HISTORICAL   D3/E/SE/SOY ISOFL/TOCOPH/LYCOP (PROSTATE 2.4 ORAL) Take 2 tablets by mouth daily.    Yes PROVIDER, HISTORICAL   diazePAM (VALIUM) 5 MG tablet Take 5 mg by mouth 3 (three) times a day.   Yes PROVIDER, HISTORICAL   esomeprazole magnesium 20 mg TbEC Take 1 Tablet 24 Hour Sustained Release by mouth daily.   Yes PROVIDER, HISTORICAL   fluticasone (FLONASE) 50 mcg/actuation nasal spray 1 with each nostril twice daily.  Patient taking differently: 1 spray into each nostril 2 (two) times a day. 1 with each nostril twice daily. 4/15/16  Yes Darrick Trammell MD   ketoconazole  (NIZORAL) 2 % shampoo Apply 1 application topically as needed for itching. Apply to damp skin, lather, leave on 5 minutes, and rinse   Yes PROVIDER, HISTORICAL   lactobacillus rhamnosus GG (CULTURELLE) 10-15 Billion cell capsule Take 1 capsule by mouth daily.   Yes PROVIDER, HISTORICAL   predniSONE (DELTASONE) 5 MG tablet Take 5 mg by mouth daily.   Yes PROVIDER, HISTORICAL   propranolol (INDERAL) 20 MG tablet Take 20 mg by mouth 3 (three) times a day.   Yes PROVIDER, HISTORICAL   saliva substitution combo no.9 (BIOTENE DRY MOUTH ORAL RINSE) Mwsh 5 mL by Mucous Membrane route as needed (dry mouth).    Yes PROVIDER, HISTORICAL   senna-docusate (PERICOLACE) 8.6-50 mg tablet Take 1 tablet by mouth 2 (two) times a day as needed for constipation.   Yes PROVIDER, HISTORICAL   tamsulosin (FLOMAX) 0.4 mg Cp24 Take 1 capsule (0.4 mg total) by mouth daily after supper. 10/24/16  Yes Darrick Trammell MD   traMADol (ULTRAM) 50 mg tablet Take 1 tablet (50 mg total) by mouth daily. Take one tablet daily for pain 1/16/17  Yes Darrick Trammell MD   traZODone (DESYREL) 100 MG tablet Take 1 tablet (100 mg total) by mouth bedtime. 12/21/16  Yes Darrick Trammell MD   diazepam (VALIUM) 5 MG tablet Take 1 tablet (5 mg total) by mouth 4 (four) times a day.  Patient taking differently: Take 5 mg by mouth 3 (three) times a day.  5/25/16 3/23/17 Yes Joel Pa MD   acetaminophen (TYLENOL) 325 MG tablet Take 650 mg by mouth every 4 (four) hours as needed for pain.  3/23/17  PROVIDER, HISTORICAL   fluticasone-salmeterol (ADVAIR) 500-50 mcg/dose DISKUS Inhale 1 puff 2 (two) times a day. 4/15/16 3/23/17  Darrick Trammell MD   omeprazole (PRILOSEC) 20 MG capsule Take 1 capsule (20 mg total) by mouth daily. 4/15/16 3/23/17  Darrick Trammell MD       Allergies:  Allergies   Allergen Reactions     Ciprofloxacin Diarrhea and Nausea Only     Keflex [Cephalexin] Diarrhea and Nausea Only       Immunizations:   Immunization History    Administered Date(s) Administered     Influenza high dose, seasonal 2015     Influenza, inj, historic 2007, 10/21/2008, 10/01/2010, 10/03/2016     Pneumo Conj 13-V (2010&after) 2015     Pneumo Polysac 23-V 07/15/2003     Td, historic 10/21/2008     ZOSTER 2011       Health Maintenance: Immunizations reviewed up-to-date.    Non-smoker uses alcohol on a social basis.    Past Medical History: Most recent colonoscopy of  reviewed no sign of obstruction or Crohn's disease.    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  Status post resection.    Non-smoker no excess alcohol perhaps 2 alcoholic drinks per day.    Allergies ciprofloxacin and cephalexin.  No prior history of alcohol withdrawal symptomatology after prior surgeries.    COPD.    Essential tremor.    Deep brain stimulator implantation then reimplantation.    Crohn's disease status post resection without recurrence.    Hypertension and hyperlipidemia.    History of squamous cell skin cancer and multiple prior plastic surgeries with revisions.    History of cystic acne vulgaris.    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    Tonsillectomy.    Current cholecystectomy.    Lumbar and cervical back operations.    Partial colon resection for Crohn's disease right side many years prior.  Latest colonoscopy no recurrence of Crohn's.  No obstruction.    Family History: Mother  age 82 cancer of the breast and congestive heart failure former patient of this examiner.    Father  62 heart disease.    Children well except one male child has had a history of thyroid cancer.    Wife accompanies him here today and is of breast cancer survivor after diagnoses of breast cancer  and .        Social History: [facility]ides in [facility].  Retired executive.    Exam Chest clear to auscultation and percussion.  Heart tones regular rhythm without murmur rub or gallop.  Abdomen soft nontender  no organomegaly.  No peritoneal signs.  Extremities free of edema cyanosis or clubbing.  Neck veins nondistended no thyromegaly or scleral icterus noted, carotids full.  Skin warm and dry easily conversant good spirited.  Normal intelligence.  Neurologically intact no gross localizing findings.  Multiple prior skin operations for skin cancer plus acne vulgaris cystic in type.  Enucleation one eye after hunting accident.    Recent diagnosis prior of C. difficile colitis also has been made with normalization now bowel movements and treated metronidazole.    Assessment and Plan  Dysphagia needs upper GI endoscopy and possible dilatation for esophageal stricture likely result of long-standing acid reflux disease.    History of Crohn's disease and history of C. difficile associated diarrhea colitis resolved after treatment with metronidazole.    Essential tremor history of deep brain stimulator implantation and removal.    Hypertension and hyperlipidemia.    Skin cancers and multiple prior surgeries per same and revisions also cystic acne vulgaris with prior surgeries for same plastic.    Total time spent with the patient today was 40 minutes of which greater than 50% was spent in counseling and coordination of care.    Darrick Trammell MD    Patient Active Problem List   Diagnosis     Benign Prostatic Hypertrophy     Hyperlipidemia     Crohn's (Granulomatous) Colitis     Blindness of one eye     Essential tremor     COPD (chronic obstructive pulmonary disease)     S/P total hip arthroplasty     Hip osteoarthritis     Rotator cuff tear, right     Postoperative pain     Dislocated elbow     Elbow dislocation     Essential hypertension with goal blood pressure less than 140/90     Crohn's disease without complication, unspecified gastrointestinal tract location     GERD without esophagitis     Dislocation, elbow

## 2021-06-11 NOTE — PROGRESS NOTES
Office Visit - Follow up    Tyron Sharma   82 y.o. male    Date of Visit: 6/9/2017    Chief Complaint   Patient presents with     Cough     chest congestion     Shortness of Breath     Urinary Frequency     not emptying and incontinence       Subjective: COPD cough with chest congestion 3 days short of breath.  Overflow incontinence as well suggest [facility] consultation with Dr. Konstantin Hutson or Dr. Elmer Torres or associate at [phone].    Chest congestion better in the past with Z-Viraj.  Patient has had a history of multiple medical problems see problem list reviewed in detail.    Allergies ciprofloxacin plus cephalexin.    No blood in stool or urine or sputum.  Medication list reviewed generally well-tolerated.    ROS: A comprehensive review of systems was performed and was otherwise negative    Medications:  Prior to Admission medications    Medication Sig Start Date End Date Taking? Authorizing Provider   acetaminophen (TYLENOL) 500 MG tablet Take 100 mg by mouth 2 (two) times a day.   Yes PROVIDER, HISTORICAL   ADVAIR DISKUS 500-50 mcg/dose DISKUS INHALE ONE PUFF BY MOUTH TWICE DAILY 3/18/17  Yes Darrick Trammell MD   artificial tears,hypromellose, (GENTEAL) 0.3 % Drop Administer 1 drop to both eyes 4 (four) times a day as needed (dry eye).   Yes PROVIDER, HISTORICAL   D3/E/SE/SOY ISOFL/TOCOPH/LYCOP (PROSTATE 2.4 ORAL) Take 2 tablets by mouth daily.    Yes PROVIDER, HISTORICAL   diazePAM (VALIUM) 5 MG tablet Take 5 mg by mouth 3 (three) times a day.   Yes PROVIDER, HISTORICAL   esomeprazole magnesium 20 mg TbEC Take 1 Tablet 24 Hour Sustained Release by mouth daily.   Yes PROVIDER, HISTORICAL   fluticasone (FLONASE) 50 mcg/actuation nasal spray 1 with each nostril twice daily.  Patient taking differently: 1 spray into each nostril 2 (two) times a day. 1 with each nostril twice daily. 4/15/16  Yes Darrick Trammell MD   ketoconazole (NIZORAL) 2 % shampoo Apply 1 application topically as  needed for itching. Apply to damp skin, lather, leave on 5 minutes, and rinse   Yes PROVIDER, HISTORICAL   lactobacillus rhamnosus GG (CULTURELLE) 10-15 Billion cell capsule Take 1 capsule by mouth daily.   Yes PROVIDER, HISTORICAL   predniSONE (DELTASONE) 5 MG tablet Take 5 mg by mouth daily.   Yes PROVIDER, HISTORICAL   propranolol (INDERAL) 10 MG tablet Take 10 mg by mouth 3 (three) times a day.   Yes PROVIDER, HISTORICAL   propranolol (INDERAL) 20 MG tablet Take 20 mg by mouth 3 (three) times a day.   Yes PROVIDER, HISTORICAL   saliva substitution combo no.9 (BIOTENE DRY MOUTH ORAL RINSE) Mwsh 5 mL by Mucous Membrane route as needed (dry mouth).    Yes PROVIDER, HISTORICAL   senna-docusate (PERICOLACE) 8.6-50 mg tablet Take 1 tablet by mouth 2 (two) times a day as needed for constipation.   Yes PROVIDER, HISTORICAL   traMADol (ULTRAM) 50 mg tablet TAKE ONE TABLET BY MOUTH ONCE DAILY AS NEEDED 5/15/17  Yes Darrick Trammell MD   traZODone (DESYREL) 100 MG tablet Take 1 tablet (100 mg total) by mouth bedtime. 12/21/16  Yes Darrick Trammell MD   azithromycin (ZITHROMAX) 250 MG tablet Take 500mg (2 tablets) day one, and then 250mg (1 tablet) days 2-5 6/9/17   Darrick Trammell MD   clindamycin (CLEOCIN T) 1 % lotion Apply 1 application topically 2 (two) times a day as needed. prn    PROVIDER, HISTORICAL   tamsulosin (FLOMAX) 0.4 mg Cp24 Take 1 capsule (0.4 mg total) by mouth daily after supper. 10/24/16   Darrick Trammell MD       Allergies:   Allergies   Allergen Reactions     Ciprofloxacin Diarrhea and Nausea Only     Keflex [Cephalexin] Diarrhea and Nausea Only       Immunizations:   Immunization History   Administered Date(s) Administered     Influenza high dose, seasonal 12/23/2015     Influenza, inj, historic 11/05/2007, 10/21/2008, 10/01/2010, 10/03/2016     Pneumo Conj 13-V (2010&after) 02/20/2015     Pneumo Polysac 23-V 07/15/2003     Td, historic 10/21/2008     ZOSTER 06/17/2011       Exam Chest  clear to auscultation and percussion.  Heart tones regular rhythm without murmur rub or gallop.  Abdomen soft nontender no organomegaly.  No peritoneal signs.  Extremities free of edema cyanosis or clubbing.  Neck veins nondistended no thyromegaly or scleral icterus noted, carotids full.  Skin warm and dry easily conversant good spirited.  Normal intelligence.  Neurologically intact no gross localizing findings.    Assessment and Plan  COPD rule out pneumonia.  Chest x-ray Z-Viraj with allergies to ciprofloxacin as well as cephalexin.    O2 sats today 94% room air temperature 97.6.    Overflow incontinence suggest [facility] consultation with Dr. Konstantin Hutson or Dr. Elmer Torres at [phone].    Time: total time spent with the patient was 25 minutes of which >50% was spent in counseling and coordination of care    The following high BMI interventions were performed this visit: encouragement to exercise    Darrick Trammell MD    Patient Active Problem List   Diagnosis     Benign Prostatic Hypertrophy     Hyperlipidemia     Crohn's (Granulomatous) Colitis     Blindness of one eye     Essential tremor     COPD (chronic obstructive pulmonary disease)     S/P total hip arthroplasty     Hip osteoarthritis     Rotator cuff tear, right     Postoperative pain     Dislocated elbow     Elbow dislocation     Essential hypertension with goal blood pressure less than 140/90     Crohn's disease without complication, unspecified gastrointestinal tract location     GERD without esophagitis     Dislocation, elbow

## 2021-06-12 NOTE — PROGRESS NOTES
"Assessment/Plan:        Diagnoses and all orders for this visit:    Simple chronic bronchitis  -     umeclidinium-vilanterol (ANORO ELLIPTA) 62.5-25 mcg/actuation inhaler; Inhale 1 puff daily.  Dispense: 1 Inhaler; Refill: 0  -     Nebulizer  -     albuterol (PROVENTIL) 2.5 mg /3 mL (0.083 %) nebulizer solution; Take 3 mL (2.5 mg total) by nebulization every 6 (six) hours as needed for wheezing.  Dispense: 90 vial; Refill: 11    Dyspnea on exertion  -     XR Sniff Test; Future; Expected date: 9/14/17    Elevated hemidiaphragm    83-year-old man with multiple medical problems including chronic bronchitis, chronic diarrhea with previous episodes of C. difficile, essential tremor on deep brain stimulator here for worsening shortness of breath.  He has evidence on exam for increased bronchial secretions.  In addition there may be some contribution of different dysfunction.    We will make a small change in his inhalers and evaluate for paralyzed hemidiaphragm.  I explained in detail that although he does have some pulmonary morbidities this is in the setting of multiple morbidities that will be limiting his breathing as well including his chronic diarrhea and decreased physical activity due to joint pain.    Medication Plan  Stop advair    Start Anoro - this is a combination medicine like advair taken once daily   -we will give you a month's supply    -you can give us a call [phone] to let us know if it was helpful.    Start albuterol nebulizers 1-3 times daily as needed for wheezing, chest tightness, or congestion.   -we will give you a prescription for a nebulizer machine    Exercise Plan  Continue your exercise plan.  5-10 minutes of walking as tolerated.  1-2 times on the exercise.    Xray Plan  We will send an order for a diaphragm xray - \"sniff test\" - to [facility].        Subjective:    Patient ID: Tyron Sharma is a 83 y.o. male.    HPI Comments: Shortness of breath.  Worse over the last 6 " months.  Slowly getting worse.  Worse with minimal physical activity.  Advair and nebulizer seem to help.  Localizes to the chest.  Positives include wheezing, cough, phlegm that is clear.  Pertinent negatives include no fever, hemoptysis.  He has a chest cold about once per year.    Diagnosed with chronic bronchitis.  Advair 500-50 twice daily.  On it for years.  Nasal spray.  No other medications.  He has chest cold about once per year.  No clear triggers.    Struggling with chronic diarrhea.  This is in the setting of previous Clostridium difficile.  He is currently being evaluated with stool culture.    He also has leg swelling.  This is often worse at the end of the day.  It is better in the morning.    He has essential tremor.  He was treated with a deep brain stimulator in the 80s.  It was highly complex brain surgery requiring multiple surgeries.  He has had significant benefit from it.  He has had his battery replaced at least once.        Review of Systems   Constitutional: Negative.    HENT: Negative.    Eyes: Negative.    Respiratory: Negative.    Cardiovascular: Negative.    Gastrointestinal: Negative.    Endocrine: Negative.    Genitourinary: Negative.    Musculoskeletal: Negative.    Skin: Negative.    Allergic/Immunologic: Negative.    Neurological: Negative.    Hematological: Negative.    Psychiatric/Behavioral: Negative.              Objective:    Physical Exam   Constitutional: He is oriented to person, place, and time. He appears well-developed and well-nourished. No distress.   HENT:   Head: Normocephalic.   Nose: Nose normal.   Mouth/Throat: Oropharynx is clear and moist. No oropharyngeal exudate.   Eyes: Right eye exhibits no discharge. Left eye exhibits no discharge. No scleral icterus.   Right eye prosthesis   Neck: Normal range of motion. No JVD present. No tracheal deviation present. No thyromegaly present.   Cardiovascular: Normal rate and regular rhythm.  Exam reveals no gallop and no  friction rub.    No murmur heard.  Pulmonary/Chest: Effort normal. No stridor. No respiratory distress. He has no wheezes. He has rales (Left base).   Abdominal: Soft. Bowel sounds are normal. He exhibits distension. There is no tenderness.   Musculoskeletal: He exhibits no edema or tenderness.   Lymphadenopathy:     He has no cervical adenopathy.   Neurological: He is alert and oriented to person, place, and time. No cranial nerve deficit.   Skin: Skin is warm and dry. No rash noted. He is not diaphoretic. No erythema. No pallor.   Psychiatric: He has a normal mood and affect. His behavior is normal. Judgment and thought content normal.           Current Outpatient Prescriptions on File Prior to Visit   Medication Sig Dispense Refill     acetaminophen (TYLENOL) 500 MG tablet Take 100 mg by mouth 2 (two) times a day.       ADVAIR DISKUS 500-50 mcg/dose DISKUS INHALE ONE PUFF BY MOUTH TWICE DAILY 180 each 0     artificial tears,hypromellose, (GENTEAL) 0.3 % Drop Administer 1 drop to both eyes 4 (four) times a day as needed (dry eye).       azithromycin (ZITHROMAX) 250 MG tablet Take 500mg (2 tablets) day one, and then 250mg (1 tablet) days 2-5 6 tablet 0     clindamycin (CLEOCIN T) 1 % lotion Apply 1 application topically 2 (two) times a day as needed. prn       D3/E/SE/SOY ISOFL/TOCOPH/LYCOP (PROSTATE 2.4 ORAL) Take 2 tablets by mouth daily.        diazePAM (VALIUM) 5 MG tablet Take 5 mg by mouth 3 (three) times a day.       esomeprazole magnesium 20 mg TbEC Take 1 Tablet 24 Hour Sustained Release by mouth daily.       fluticasone (FLONASE) 50 mcg/actuation nasal spray 1 with each nostril twice daily. (Patient taking differently: 1 spray into each nostril 2 (two) times a day. 1 with each nostril twice daily.) 16 g 3     ketoconazole (NIZORAL) 2 % shampoo Apply 1 application topically as needed for itching. Apply to damp skin, lather, leave on 5 minutes, and rinse       lactobacillus rhamnosus GG (CULTURELLE) 10-15  "Billion cell capsule Take 1 capsule by mouth daily.       predniSONE (DELTASONE) 5 MG tablet Take 5 mg by mouth daily.       propranolol (INDERAL) 10 MG tablet Take 10 mg by mouth 3 (three) times a day.       propranolol (INDERAL) 20 MG tablet Take 20 mg by mouth 3 (three) times a day.       saliva substitution combo no.9 (BIOTENE DRY MOUTH ORAL RINSE) Mwsh 5 mL by Mucous Membrane route as needed (dry mouth).        senna-docusate (PERICOLACE) 8.6-50 mg tablet Take 1 tablet by mouth 2 (two) times a day as needed for constipation.       tamsulosin (FLOMAX) 0.4 mg Cp24 Take 1 capsule (0.4 mg total) by mouth daily after supper. 100 capsule 5     traMADol (ULTRAM) 50 mg tablet TAKE ONE TABLET BY MOUTH ONCE DAILY AS NEEDED 30 tablet 0     traMADol (ULTRAM) 50 mg tablet Take 1 tab by mouth once daily prn 30 tablet 0     traZODone (DESYREL) 100 MG tablet Take 1 tablet (100 mg total) by mouth bedtime. 90 tablet 3     No current facility-administered medications on file prior to visit.      /60  Pulse 72  Resp 24  Ht 5' 8\" (1.727 m)  Wt 183 lb 4.8 oz (83.1 kg)  SpO2 94% Comment: RA  BMI 27.87 kg/m2    Medical History  Active Ambulatory (Non-Hospital) Problems    Diagnosis     Dislocation, elbow     Elbow dislocation     Essential hypertension with goal blood pressure less than 140/90     Crohn's disease without complication, unspecified gastrointestinal tract location     GERD without esophagitis     Dislocated elbow     Postoperative pain     Rotator cuff tear, right     S/P total hip arthroplasty     Hip osteoarthritis     COPD (chronic obstructive pulmonary disease)     Blindness of one eye     Essential tremor     Benign Prostatic Hypertrophy     Hyperlipidemia     Crohn's (Granulomatous) Colitis     Past Medical History:   Diagnosis Date     Anxiety      Arthritis      Cancer      Crohn's disease      Emphysema of lung      Essential tremor      ETOH abuse      GERD (gastroesophageal reflux disease)      " Hyperlipemia      Hypertension      Osteoporosis      S/P deep brain stimulator placement      Tremor of right hand         Surgical History  He  has a past surgical history that includes prothetic eye (Right, 1954); [place] hole w/ stereotactic insertion of DBS leads / intraop microelectrode recording (Bilateral); Appendectomy; Brain surgery; Breast surgery; Colon surgery; Cholecystectomy; Eye surgery; Spine surgery; shldr arthroscop,surg,w/rotat cuff repr (Right, 6/1/2016); fix collat lig,mc-p jt,i-p jt (Right, 10/27/2016); Elbow fracture surgery (Right, 11/10/2016); and remove extern bone fix dev w anesth (Right, 1/12/2017).       Social History  Reviewed, and he  reports that he quit smoking about 33 years ago. He has a 45.00 pack-year smoking history. He has never used smokeless tobacco. He reports that he drinks about 12.6 oz of alcohol per week  He reports that he does not use illicit drugs.    Jared Mojica  Retired from [employer], manager, primarily over sight.   Allergies  Allergies   Allergen Reactions     Ciprofloxacin Diarrhea and Nausea Only     Keflex [Cephalexin] Diarrhea and Nausea Only    Family History  Reviewed, and family history is negative for Anesthesia problems and Clotting disorder.                            Data Review - imaging, labs, and ekgs listed below were reviewed by me.  Chest XRay and chest CT images and EKG tracings interpreted personally.     Past Labs  Appointment on 09/08/2017   Component Date Value     Hgb 09/08/2017 15.9        Past Imaging  Chest x-ray images reviewed.  Right sided deep brain stimulator in place.  Right hemidiaphragm slightly elevated.      Pulmonary function tests: Reduction in FVC with FEV1 in low range.  Ratio normal.  No response to bronchodilators.  The patient reports improvement in symptoms with nebulizer.  TLC and low range normal.  DLCO normal.    Nonspecific reduction in FVC without evidence of obstruction or restriction on pulmonary function tests no  diffusion defect.    60 minutes spent on this visit.  Greater than 50% counseling and coordination of care.

## 2021-06-12 NOTE — PROGRESS NOTES
Patient instructed in use of anoro ellipta inhaler.  Patient able to use the inhaler device without any difficulty.  Return demo and first dose given in clinic.

## 2021-06-13 NOTE — PROGRESS NOTES
Assessment/Plan:        Diagnoses and all orders for this visit:    Simple chronic bronchitis  -     azithromycin (ZITHROMAX) 250 MG tablet; Take 1 tablet (250 mg total) by mouth daily for 5 days. Take 500 mg (2 x 250 mg tablets) on day 1 followed by 250 mg (1 tablet) on days 2-5.  Dispense: 6 tablet; Refill: 0  -     diazePAM (VALIUM) 5 MG tablet; 2.5 mg 2 times daily with additional 5 mg as needed.  Dispense: 60 tablet; Refill: 0    Anxiety  -     diazePAM (VALIUM) 5 MG tablet; 2.5 mg 2 times daily with additional 5 mg as needed.  Dispense: 60 tablet; Refill: 0    Chronic respiratory failure with hypoxia  -     diazePAM (VALIUM) 5 MG tablet; 2.5 mg 2 times daily with additional 5 mg as needed.  Dispense: 60 tablet; Refill: 0    83-year-old man with multiple medical problems including chronic bronchitis, chronic diarrhea with recurrent C. difficile, essential tremor doing poorly after recent admission.  It sounds like he was destabilized with C. difficile diarrhea and had concomitant worsening bronchitis.  He was not treated with antibiotics and his hospitalization which is understandable at this point given his persistent sputum production and symptoms I will go ahead and treat him with azithromycin.  We discussed the risks and benefits of this and he agrees with going ahead.  He will continue vancomycin until 2 weeks afterwards.    His oxygenation is fine today.  He seems to be oxygen eating well on 2 L, 3 L are on room air.  Fluid can get this off.  He is tremendously fatigued but his primary function tests are not very bad and I am concerned that his presentation is complicated by his poor nutrition status, depression or anxiousness, diarrhea.    Will treat for bronchitis, treat for anxiousness, and hopefully his C. difficile will improve.    There is efforts to taper his Valium for tremor.  This is overall very reasonable but given his current issues with anxiousness this will only be worsened by his  benzodiazepine being tapered off.  I have asked him to stop this.    There is a low chance that his propranolol is worsening his obstructive lung disease.  However I have asked him to stop this.    Medication Plan  Start - Zpack (2 pills on day one and then 1 pill for 4 days after)    Continue   -Vancomycin (c diff medicine) for at least 2 weeks after antibiotics   -Anoro once daily   -albuterol as needed    Change  Valium    - 2.5 mg twice daily for your tremor   - 5 mg once daily as needed for anxiety or stress    Stop   propranolol    Oxygen Plan  Goal oxygen level >89%      Very detailed extensive visit with history collected from the patient, his wife, and his daughter.  Detailed explanation of his COPD and how it fits in with his persistent tremor, chronic Valium use, chronic C. difficile, malnutrition.    Subjective:    Patient ID: Tyron Sharma is a 83 y.o. male.    HPI Comments:   83M with history of recurrent cdiff, bronchitis, here after hospitalization.  Approx 10 days ago he developed worsening diarrhea.  He began to have breathing difficulties as well.  No sputum or fevers at that time.  No clear provocative or palliative factors. No other localizing signs.  Pertinent positives include abdominal pain, now new sputum production.      Review of Systems : negative except as above        Objective:    Physical Exam   Constitutional: He is oriented to person, place, and time. He appears well-developed and well-nourished. No distress.   HENT:   Head: Normocephalic.   Nose: Nose normal.   Mouth/Throat: Oropharynx is clear and moist. No oropharyngeal exudate.   Eyes: Right eye exhibits no discharge. Left eye exhibits no discharge. No scleral icterus.   Right eye prosthesis   Neck: Normal range of motion. No JVD present. No tracheal deviation present. No thyromegaly present.   Cardiovascular: Normal rate and regular rhythm.  Exam reveals no gallop and no friction rub.    No murmur heard.  Pulmonary/Chest:  Effort normal. No stridor. No respiratory distress. He has no wheezes. He has rales (biltateral base).   Abdominal: Soft. Bowel sounds are normal. He exhibits distension. There is tenderness.   Musculoskeletal: He exhibits no edema or tenderness.   Lymphadenopathy:     He has no cervical adenopathy.   Neurological: He is alert and oriented to person, place, and time. No cranial nerve deficit.   Skin: Skin is warm and dry. No rash noted. He is not diaphoretic. No erythema. No pallor.   Psychiatric: He has a normal mood and affect. His behavior is normal. Judgment and thought content normal.           Current Outpatient Prescriptions on File Prior to Visit   Medication Sig Dispense Refill     acetaminophen (TYLENOL) 500 MG tablet Take 100 mg by mouth 2 (two) times a day.       albuterol (PROVENTIL) 2.5 mg /3 mL (0.083 %) nebulizer solution Take 3 mL (2.5 mg total) by nebulization every 6 (six) hours as needed for wheezing. 90 vial 11     artificial tears,hypromellose, (GENTEAL) 0.3 % Drop Administer 1 drop to both eyes 4 (four) times a day as needed (dry eye).       azithromycin (ZITHROMAX) 250 MG tablet Take 500mg (2 tablets) day one, and then 250mg (1 tablet) days 2-5 6 tablet 0     clindamycin (CLEOCIN T) 1 % lotion Apply 1 application topically 2 (two) times a day as needed. prn       D3/E/SE/SOY ISOFL/TOCOPH/LYCOP (PROSTATE 2.4 ORAL) Take 2 tablets by mouth daily.        diazePAM (VALIUM) 5 MG tablet Take 5 mg by mouth 3 (three) times a day.       doxycycline (VIBRAMYCIN) 100 MG capsule Take 1 capsule (100 mg total) by mouth 2 (two) times a day for 10 days. 20 capsule 0     esomeprazole magnesium 20 mg TbEC Take 1 Tablet 24 Hour Sustained Release by mouth daily.       finasteride (PROSCAR) 5 mg tablet Take 5 mg by mouth daily.       fluticasone (FLONASE) 50 mcg/actuation nasal spray 1 with each nostril twice daily. (Patient taking differently: 1 spray into each nostril 2 (two) times a day. 1 with each nostril  twice daily.) 16 g 3     furosemide (LASIX) 20 MG tablet Take 1 tablet (20 mg total) by mouth daily for 7 days. 7 tablet 0     ketoconazole (NIZORAL) 2 % shampoo Apply 1 application topically as needed for itching. Apply to damp skin, lather, leave on 5 minutes, and rinse       lactobacillus rhamnosus GG (CULTURELLE) 10-15 Billion cell capsule Take 1 capsule by mouth daily.       predniSONE (DELTASONE) 5 MG tablet Take 5 mg by mouth daily.       propranolol (INDERAL) 10 MG tablet Take 10 mg by mouth 3 (three) times a day.       propranolol (INDERAL) 20 MG tablet Take 20 mg by mouth 3 (three) times a day.       saliva substitution combo no.9 (BIOTENE DRY MOUTH ORAL RINSE) Mwsh 5 mL by Mucous Membrane route as needed (dry mouth).        senna-docusate (PERICOLACE) 8.6-50 mg tablet Take 1 tablet by mouth 2 (two) times a day as needed for constipation.       tamsulosin (FLOMAX) 0.4 mg Cp24 Take 1 capsule (0.4 mg total) by mouth daily after supper. 100 capsule 5     traMADol (ULTRAM) 50 mg tablet TAKE ONE TABLET BY MOUTH ONCE DAILY AS NEEDED 30 tablet 0     traZODone (DESYREL) 100 MG tablet Take 1 tablet (100 mg total) by mouth bedtime. 90 tablet 3     umeclidinium-vilanterol (ANORO ELLIPTA) 62.5-25 mcg/actuation inhaler Inhale 1 puff daily. 1 Inhaler 0     [DISCONTINUED] traMADol (ULTRAM) 50 mg tablet Take 1 tab by mouth once daily prn 30 tablet 0     No current facility-administered medications on file prior to visit.      BP 96/54  Resp 18  Wt 184 lb (83.5 kg)  BMI 27.98 kg/m2    Medical History  Active Ambulatory (Non-Hospital) Problems    Diagnosis     Dislocation, elbow     Elbow dislocation     Essential hypertension with goal blood pressure less than 140/90     Crohn's disease without complication, unspecified gastrointestinal tract location     GERD without esophagitis     Dislocated elbow     Postoperative pain     Rotator cuff tear, right     S/P total hip arthroplasty     Hip osteoarthritis     COPD  (chronic obstructive pulmonary disease)     Blindness of one eye     Essential tremor     Benign Prostatic Hypertrophy     Hyperlipidemia     Crohn's (Granulomatous) Colitis     Past Medical History:   Diagnosis Date     Anxiety      Arthritis      Cancer      Crohn's disease      Emphysema of lung      Essential tremor      ETOH abuse      GERD (gastroesophageal reflux disease)      Hyperlipemia      Hypertension      Osteoporosis      S/P deep brain stimulator placement      Tremor of right hand         Surgical History  He  has a past surgical history that includes prothetic eye (Right, 1954); [place] hole w/ stereotactic insertion of DBS leads / intraop microelectrode recording (Bilateral); Appendectomy; Brain surgery; Breast surgery; Colon surgery; Cholecystectomy; Eye surgery; Spine surgery; shldr arthroscop,surg,w/rotat cuff repr (Right, 6/1/2016); fix collat lig,mc-p jt,i-p jt (Right, 10/27/2016); Elbow fracture surgery (Right, 11/10/2016); and remove extern bone fix dev w anesth (Right, 1/12/2017).       Social History  Reviewed, and he lives at home with his wife.   Allergies  Allergies   Allergen Reactions     Ciprofloxacin Diarrhea and Nausea Only     Keflex [Cephalexin] Diarrhea and Nausea Only    Family History  Reviewed, and family history is negative for Anesthesia problems and Clotting disorder.                            Data Review - imaging, labs, and ekgs listed below were reviewed by me.  Chest XRay and chest CT images and EKG tracings interpreted personally.     Past Labs  Appointment on 09/08/2017   Component Date Value     Hgb 09/08/2017 15.9          50 minutes spent on this visit.  Greater than 50% counseling and coordination of care.

## 2021-06-13 NOTE — PROGRESS NOTES
Office Visit - Follow up    Tyron Sharma   83 y.o. male    Date of Visit: 10/26/2017    Chief Complaint   Patient presents with     COPD     doing better       Subjective: COPD and exacerbation with recent C. difficile infection ×2 courses of vancomycin both 14 days each presumably 125 mg 4 times a day for oral vancomycin.  Treatment patient had been treated by Dr. Duran with pulmonary medicine with Z-Viraj.  3 day hospital stay for COPD and exacerbation feels better now flu shot given.  Seen by my associate Dr. SANTO on October 12, 2017 for bronchitis.    No blood in stool or urine or sputum medication list reviewed well-tolerated normal effects.  Weight is up 2 pounds.    ROS: A comprehensive review of systems was performed and was otherwise negative    Medications:  Prior to Admission medications    Medication Sig Start Date End Date Taking? Authorizing Provider   acetaminophen (TYLENOL) 500 MG tablet Take 100 mg by mouth 2 (two) times a day.   Yes PROVIDER, HISTORICAL   albuterol (PROVENTIL) 2.5 mg /3 mL (0.083 %) nebulizer solution Take 3 mL (2.5 mg total) by nebulization every 6 (six) hours as needed for wheezing. 9/14/17  Yes Drew Duran MD   artificial tears,hypromellose, (GENTEAL) 0.3 % Drop Administer 1 drop to both eyes 4 (four) times a day as needed (dry eye).   Yes PROVIDER, HISTORICAL   D3/E/SE/SOY ISOFL/TOCOPH/LYCOP (PROSTATE 2.4 ORAL) Take 2 tablets by mouth daily.    Yes PROVIDER, HISTORICAL   diazePAM (VALIUM) 5 MG tablet Take 5 mg by mouth 3 (three) times a day.   Yes PROVIDER, HISTORICAL   finasteride (PROSCAR) 5 mg tablet Take 5 mg by mouth daily.   Yes PROVIDER, HISTORICAL   fluticasone (FLONASE) 50 mcg/actuation nasal spray 1 with each nostril twice daily.  Patient taking differently: 1 spray into each nostril 2 (two) times a day. 1 with each nostril twice daily. 4/15/16  Yes Darrick Trammell MD   lactobacillus rhamnosus GG (CULTURELLE) 10-15 Billion cell capsule Take 1 capsule  by mouth daily.   Yes PROVIDER, HISTORICAL   predniSONE (DELTASONE) 5 MG tablet Take 5 mg by mouth daily.   Yes PROVIDER, HISTORICAL   propranolol (INDERAL) 20 MG tablet Take 40 mg by mouth 3 (three) times a day.    Yes PROVIDER, HISTORICAL   saliva substitution combo no.9 (BIOTENE DRY MOUTH ORAL RINSE) Mwsh 5 mL by Mucous Membrane route as needed (dry mouth).    Yes PROVIDER, HISTORICAL   tamsulosin (FLOMAX) 0.4 mg Cp24 Take 1 capsule (0.4 mg total) by mouth daily after supper. 10/24/16  Yes Darrick Trammell MD   traMADol (ULTRAM) 50 mg tablet TAKE ONE TABLET BY MOUTH ONCE DAILY AS NEEDED 10/8/17  Yes Darrick Trammell MD   traZODone (DESYREL) 100 MG tablet Take 1 tablet (100 mg total) by mouth bedtime. 12/21/16  Yes Darrick Trammell MD   umeclidinium-vilanterol (ANORO ELLIPTA) 62.5-25 mcg/actuation inhaler Inhale 1 puff daily. 9/29/17 10/29/17 Yes Drew Duran MD   clindamycin (CLEOCIN T) 1 % lotion Apply 1 application topically 2 (two) times a day as needed. prn    PROVIDER, HISTORICAL   esomeprazole magnesium 20 mg TbEC Take 1 Tablet 24 Hour Sustained Release by mouth daily.    PROVIDER, HISTORICAL   furosemide (LASIX) 20 MG tablet Take 1 tablet (20 mg total) by mouth daily for 7 days. 9/21/17 9/28/17  Drew Duran MD   ketoconazole (NIZORAL) 2 % shampoo Apply 1 application topically as needed for itching. Apply to damp skin, lather, leave on 5 minutes, and rinse    PROVIDER, HISTORICAL   senna-docusate (PERICOLACE) 8.6-50 mg tablet Take 1 tablet by mouth 2 (two) times a day as needed for constipation.    PROVIDER, HISTORICAL   propranolol (INDERAL) 10 MG tablet Take 10 mg by mouth 3 (three) times a day.  10/26/17  PROVIDER, HISTORICAL       Allergies:   Allergies   Allergen Reactions     Ciprofloxacin Diarrhea and Nausea Only     Keflex [Cephalexin] Diarrhea and Nausea Only       Immunizations:   Immunization History   Administered Date(s) Administered     Influenza high dose,  seasonal 12/23/2015, 10/26/2017     Influenza, inj, historic 11/05/2007, 10/21/2008, 10/01/2010, 10/03/2016     Pneumo Conj 13-V (2010&after) 02/20/2015     Pneumo Polysac 23-V 07/15/2003     Td, historic 10/21/2008     ZOSTER 06/17/2011       Exam few scattered rales no rhonchi or wheezes heart tones distant abdomen benign extremities free of edema left lower extremity show trace edema neck veins nondistended no paroxysmal nocturnal dyspnea skin warm and dry lymph negative neuro negative the patient is blind in one eye wife accompanies him here today Lamar she is very supportive.    Assessment and Plan  COPD with exacerbation recent treatment with Z-Viraj and for bronchitis.  Prednisone use.    C. difficile toxin colitis ×2 courses of vancomycin 14 days each 125 mg 4 times a day.    History of Crohn's disease.    Hypertension controlled.    Multiple drug allergies including ciprofloxacin and cephalexin.  Flu shot given today left deltoid.  RTC 1 month.  Same meds and cares.  Try to avoid antibiotics as much as possible with history of C. difficile toxin associated colitis ×2.    Time: total time spent with the patient was 25 minutes of which >50% was spent in counseling and coordination of care    The following high BMI interventions were performed this visit: encouragement to exercise    Darrick Trammell MD    Patient Active Problem List   Diagnosis     Benign Prostatic Hypertrophy     Hyperlipidemia     Crohn's (Granulomatous) Colitis     Blindness of one eye     Essential tremor     COPD (chronic obstructive pulmonary disease)     S/P total hip arthroplasty     Hip osteoarthritis     Rotator cuff tear, right     Postoperative pain     Dislocated elbow     Elbow dislocation     Essential hypertension with goal blood pressure less than 140/90     Crohn's disease without complication, unspecified gastrointestinal tract location     GERD without esophagitis     Dislocation, elbow

## 2021-06-13 NOTE — PROGRESS NOTES
Wife Lamar called to say she is concerned about Pop's Breathing. Just came home from the hospital yesterday and is having SOB, cough with secretions,  And his O2 sats are 86 % on 2 liters. Also states he has increased edema in his ankles. Is on prednisone from the Hospital. Will start Doxycycline if   Fever and Lasix for 7 days. Instructed to increase his O2 to 3 to 4 liters to keep his sats greater then 87 %.  Will be seen in clinic next Thursday. Instructed to go back to the hospital if breathing gets worse.

## 2021-06-13 NOTE — PROGRESS NOTES
Office Visit - Follow up    Tyron Sharma   83 y.o. male    Date of Visit: 10/12/2017    Chief Complaint   Patient presents with     Follow-up     Hospital follow up       Subjective: Very pleasant 83-year-old gentleman who is a patient of Dr. Darrick Cohen.  This is a transitional visit for follow-up after recent admission recently discharged from hospital in [place] after admission for Clostridium difficile and COPD exacerbation.  Ultimately discharged to home and was seen in consultation as requested by Dr. Trammell to see Dr. Junior Duran.  These notes are reviewed.  Saw Dr. Duran for follow-up as well last week.    Comorbidities includes Crohn disease chronic reflux esophagitis hypertension advanced COPD essential tremor and generalized debilitation.    Since discharge has had profound weakness and fatigue.  He did require management with supplemental oxygen at home although O2 sats have returned.  Is chronically on steroids for Crohn's disease primarily was not treated with a pulse of steroids although these records are not available at the time of this dictation.  These will be obtained and reviewed.    No other new concerns although appetite has been poor and he has an extremely weak.  He has 4 days left on a 14 day repeat treatment course of vancomycin for recurrent Clostridium difficile.  Stools are better.  Still has loose stools.  Appetite is extremely poor he is doing the best he can with nutritional supplement.  Denies significant GI or  symptoms.  Dyspnea is improving still has a chronic cough denies chest pain has not had orthopnea or PND denies neuro symptoms or graft current medications reviewed discussed and reconciled    ROS: A comprehensive review of systems was performed and was otherwise negative except as mentioned above.     Exam  He appears a bit chronically debilitated and fatigued skin generally negative skin turgor normal no JVD lungs clear abdomen soft heart negative rhythm  "regular mild tremor neuro negative abdomen unremarkable hyperactive bowel sounds.  O2 saturation 96% room air at rest   /60  Pulse 64  Ht 5' 8\" (1.727 m)  Wt 176 lb (79.8 kg)  BMI 26.76 kg/m2    Assessment and Plan  COPD exacerbation improving but has significant underlying fatigue.    Clostridium difficile infection with recurrence appears to read be responding to therapy    Generalized debilitation from multiple comorbidities contributing to fatigue    Some malnutrition related to illness    We are checking CBC and metabolic profile.  No change in regimen he will continue with course of vancomycin.  If he has recurrent diarrhea will need repeat stool culture.    If labs abnormal I will communicate with him.  Have encouraged supplement and adequate food and fluid intake.  Anticipate he will improve with time.  I would like to have him follow-up with Dr. Trammell in about 10 days.  Continue with prednisone at current dose continue with other medications.  No further antibiotics or pulse of steroids at this time    Tyron was seen today for follow-up.    Diagnoses and all orders for this visit:    Simple chronic bronchitis  -     HM2(CBC w/o Differential); Future  -     Cancel: Basic Metabolic Panel  -     Comprehensive Metabolic Panel; Future    Essential tremor  -     HM2(CBC w/o Differential); Future  -     Cancel: Basic Metabolic Panel  -     Comprehensive Metabolic Panel; Future    Essential hypertension with goal blood pressure less than 140/90  -     HM2(CBC w/o Differential); Future  -     Cancel: Basic Metabolic Panel  -     Comprehensive Metabolic Panel; Future    GERD without esophagitis  -     HM2(CBC w/o Differential); Future  -     Cancel: Basic Metabolic Panel  -     Comprehensive Metabolic Panel; Future    Crohn's disease without complication, unspecified gastrointestinal tract location  -     Comprehensive Metabolic Panel; Future    Fatigue due to treatment          Time: total time spent " with the patient was 45 minutes of which >50% was spent in counseling and coordination of care        Allergies   Allergen Reactions     Ciprofloxacin Diarrhea and Nausea Only     Keflex [Cephalexin] Diarrhea and Nausea Only       Medications :  Prior to Admission medications    Medication Sig Start Date End Date Taking? Authorizing Provider   acetaminophen (TYLENOL) 500 MG tablet Take 100 mg by mouth 2 (two) times a day.   Yes PROVIDER, HISTORICAL   albuterol (PROVENTIL) 2.5 mg /3 mL (0.083 %) nebulizer solution Take 3 mL (2.5 mg total) by nebulization every 6 (six) hours as needed for wheezing. 9/14/17  Yes Drew Duran MD   artificial tears,hypromellose, (GENTEAL) 0.3 % Drop Administer 1 drop to both eyes 4 (four) times a day as needed (dry eye).   Yes PROVIDER, HISTORICAL   clindamycin (CLEOCIN T) 1 % lotion Apply 1 application topically 2 (two) times a day as needed. prn   Yes PROVIDER, HISTORICAL   D3/E/SE/SOY ISOFL/TOCOPH/LYCOP (PROSTATE 2.4 ORAL) Take 2 tablets by mouth daily.    Yes PROVIDER, HISTORICAL   diazePAM (VALIUM) 5 MG tablet Take 5 mg by mouth 3 (three) times a day.   Yes PROVIDER, HISTORICAL   esomeprazole magnesium 20 mg TbEC Take 1 Tablet 24 Hour Sustained Release by mouth daily.   Yes PROVIDER, HISTORICAL   finasteride (PROSCAR) 5 mg tablet Take 5 mg by mouth daily.   Yes PROVIDER, HISTORICAL   fluticasone (FLONASE) 50 mcg/actuation nasal spray 1 with each nostril twice daily.  Patient taking differently: 1 spray into each nostril 2 (two) times a day. 1 with each nostril twice daily. 4/15/16  Yes Darrick Trammell MD   ketoconazole (NIZORAL) 2 % shampoo Apply 1 application topically as needed for itching. Apply to damp skin, lather, leave on 5 minutes, and rinse   Yes PROVIDER, HISTORICAL   lactobacillus rhamnosus GG (CULTURELLE) 10-15 Billion cell capsule Take 1 capsule by mouth daily.   Yes PROVIDER, HISTORICAL   predniSONE (DELTASONE) 5 MG tablet Take 5 mg by mouth daily.   Yes  PROVIDER, HISTORICAL   propranolol (INDERAL) 10 MG tablet Take 10 mg by mouth 3 (three) times a day.   Yes PROVIDER, HISTORICAL   propranolol (INDERAL) 20 MG tablet Take 20 mg by mouth 3 (three) times a day.   Yes PROVIDER, HISTORICAL   saliva substitution combo no.9 (BIOTENE DRY MOUTH ORAL RINSE) Mwsh 5 mL by Mucous Membrane route as needed (dry mouth).    Yes PROVIDER, HISTORICAL   senna-docusate (PERICOLACE) 8.6-50 mg tablet Take 1 tablet by mouth 2 (two) times a day as needed for constipation.   Yes PROVIDER, HISTORICAL   tamsulosin (FLOMAX) 0.4 mg Cp24 Take 1 capsule (0.4 mg total) by mouth daily after supper. 10/24/16  Yes Darrick Trammell MD   traMADol (ULTRAM) 50 mg tablet TAKE ONE TABLET BY MOUTH ONCE DAILY AS NEEDED 10/8/17  Yes Darrick Trammell MD   traZODone (DESYREL) 100 MG tablet Take 1 tablet (100 mg total) by mouth bedtime. 12/21/16  Yes Darrick Trammell MD   umeclidinium-vilanterol (ANORO ELLIPTA) 62.5-25 mcg/actuation inhaler Inhale 1 puff daily. 9/29/17 10/29/17 Yes Drew Duran MD   vancomycin (VANCOCIN) 125 MG capsule Take 1 capsule (125 mg total) by mouth 4 (four) times a day for 14 days. 10/3/17 10/17/17 Yes Drew Duran MD   furosemide (LASIX) 20 MG tablet Take 1 tablet (20 mg total) by mouth daily for 7 days. 9/21/17 9/28/17  Drew Duran MD   azithromycin (ZITHROMAX) 250 MG tablet Take 500mg (2 tablets) day one, and then 250mg (1 tablet) days 2-5 6/12/17 10/12/17  Darrick Trammell MD   diazePAM (VALIUM) 5 MG tablet 2.5 mg 2 times daily with additional 5 mg as needed. 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       Past Surgical History:   Past Surgical History:   Procedure Laterality Date     APPENDECTOMY       BRAIN SURGERY      Deep brain surgery     BREAST SURGERY      Gynocomastia     GARIMA HOLE W/ STEREOTACTIC INSERTION OF DBS LEADS / INTRAOP MICROELECTRODE RECORDING Bilateral      CHOLECYSTECTOMY       COLON SURGERY       ELBOW FRACTURE SURGERY Right 11/10/2016    Procedure: RIGHT ELBOW FRACTURE OPEN REDUCTION EXTERNAL FIXATION;  Surgeon: Bobby Person MD;  Location: [facility] OR;  Service:      EYE SURGERY      Prosthetic right eye     NJ FIX COLLAT LIG,MC-P JT,I-P JT Right 10/27/2016    Procedure: RIGHT ELBOW EXAMINATION UNDER ANESTHESIA, ULNAR COLLATERAL LIGAMENT REPAIR;  Surgeon: Bobby Person MD;  Location: [facility] Main OR;  Service: Orthopedics     NJ REMOVE EXTERN BONE FIX DEV W ANESTH Right 1/12/2017    Procedure: 2)  RIGHT ELBOW EXTERNAL FIXATOR REMOVAL;  Surgeon: Bobby Person MD;  Location: [facility] OR;  Service: Orthopedics     NJ SHLDR ARTHROSCOP,SURG,W/ROTAT CUFF REPR Right 6/1/2016    Procedure: RIGHT SHOULDER ARTHROSCOPIC ROTATOR CUFF REPAIR AND SUBACHROMAL DECOMPRESSION;  Surgeon: Cesar Chow MD;  Location: [facility] Main OR;  Service: Orthopedics     prothetic eye Right 1954     SPINE SURGERY      Cervical and lumbar       Social History:   Social History     Social History     Marital status:      Spouse name: N/A     Number of children: N/A     Years of education: N/A     Occupational History     Not on file.     Social History Main Topics     Smoking status: Former Smoker     Packs/day: 3.00     Years: 15.00     Quit date: 10/3/1983     Smokeless tobacco: Never Used     Alcohol use 12.6 oz/week     21 Standard drinks or equivalent per week      Comment: 3 drinks a day vodka     Drug use: No     Sexual activity: Not on file     Other Topics  Concern     Not on file     Social History Narrative       Family History:   Family History   Problem Relation Age of Onset     Anesthesia problems Neg Hx      Clotting disorder Neg Hx          Maurice Pinto MD

## 2021-06-14 NOTE — PROGRESS NOTES
Office Visit - Follow up    Tyron Sharma   83 y.o. male    Date of Visit: 11/27/2017    Chief Complaint   Patient presents with     Bronchitis     follow up     COPD       Subjective: COPD with bronchitis.  Seen by Dr. Drew Duran put on Z-Viraj.  Patient still has bronchial symptoms with cough wheezing.  Accompanied today by his wife Lamar who is a retired registered nurse.    The patient denies fevers chills or night sweats just difficulty breathing.  He has underlying COPD and history of allergy to ciprofloxacin as well as cephalexin.    Also in the past she has been treated for Crohn's disease he is followed by a gastroenterologist in the [facility] where he resides.    Twice he has had bouts of C. difficile toxin associated diarrhea or C. difficile colitis.    The patient denies any diarrhea now no blood in stool or urine no blood in sputum medication list reviewed well-tolerated.    ROS: A comprehensive review of systems was performed and was otherwise negative    Medications:  Prior to Admission medications    Medication Sig Start Date End Date Taking? Authorizing Provider   acetaminophen (TYLENOL) 500 MG tablet Take 100 mg by mouth 2 (two) times a day.   Yes PROVIDER, HISTORICAL   D3/E/SE/SOY ISOFL/TOCOPH/LYCOP (PROSTATE 2.4 ORAL) Take 2 tablets by mouth daily.    Yes PROVIDER, HISTORICAL   diazePAM (VALIUM) 5 MG tablet Take 2.5 mg by mouth 2 (two) times a day.    Yes PROVIDER, HISTORICAL   finasteride (PROSCAR) 5 mg tablet Take 5 mg by mouth daily.   Yes PROVIDER, HISTORICAL   fluticasone (FLONASE) 50 mcg/actuation nasal spray 1 with each nostril twice daily.  Patient taking differently: 1 spray into each nostril 2 (two) times a day. 1 with each nostril twice daily. 4/15/16  Yes Darrick Trammell MD   ketoconazole (NIZORAL) 2 % shampoo Apply 1 application topically as needed for itching. Apply to damp skin, lather, leave on 5 minutes, and rinse   Yes PROVIDER, HISTORICAL   lactobacillus  rhamnosus GG (CULTURELLE) 10-15 Billion cell capsule Take 1 capsule by mouth daily.   Yes PROVIDER, HISTORICAL   predniSONE (DELTASONE) 5 MG tablet Take 5 mg by mouth daily.   Yes PROVIDER, HISTORICAL   propranolol (INDERAL) 20 MG tablet Take 40 mg by mouth 3 (three) times a day.    Yes PROVIDER, HISTORICAL   tamsulosin (FLOMAX) 0.4 mg Cp24 TAKE ONE CAPSULE(0.4MG TOTAL) BY MOUTH ONCE DAILY AFTER SUPPER 11/20/17  Yes Darrick Trammell MD   traMADol (ULTRAM) 50 mg tablet Take 1 tablet (50 mg total) by mouth every 8 (eight) hours as needed for pain. 11/27/17  Yes Darrick Trammell MD   traZODone (DESYREL) 100 MG tablet TAKE ONE TABLET BY MOUTH ONCE DAILY AT BEDTIME 11/1/17  Yes Darrick Trammell MD   traMADol (ULTRAM) 50 mg tablet TAKE ONE TABLET BY MOUTH ONCE DAILY AS NEEDED 10/8/17 11/27/17 Yes Darrick Trammell MD   artificial tears,hypromellose, (GENTEAL) 0.3 % Drop Administer 1 drop to both eyes 4 (four) times a day as needed (dry eye).    PROVIDER, HISTORICAL   clindamycin (CLEOCIN T) 1 % lotion Apply 1 application topically 2 (two) times a day as needed. prn    PROVIDER, HISTORICAL   esomeprazole magnesium 20 mg TbEC Take 1 Tablet 24 Hour Sustained Release by mouth daily.    PROVIDER, HISTORICAL   furosemide (LASIX) 20 MG tablet Take 1 tablet (20 mg total) by mouth daily for 7 days. 9/21/17 9/28/17  Drew Duran MD   saliva substitution combo no.9 (BIOTENE DRY MOUTH ORAL RINSE) Mwsh 5 mL by Mucous Membrane route as needed (dry mouth).     PROVIDER, HISTORICAL   senna-docusate (PERICOLACE) 8.6-50 mg tablet Take 1 tablet by mouth 2 (two) times a day as needed for constipation.    PROVIDER, HISTORICAL   umeclidinium-vilanterol (ANORO ELLIPTA) 62.5-25 mcg/actuation inhaler Inhale 1 puff daily. 9/29/17 10/29/17  Drew Duran MD   predniSONE (DELTASONE) 20 MG tablet Take 1 tablet (20 mg total) by mouth daily. 11/9/17 11/27/17  Drew Duran MD       Allergies:   Allergies    Allergen Reactions     Ciprofloxacin Diarrhea and Nausea Only     Keflex [Cephalexin] Diarrhea and Nausea Only       Immunizations:   Immunization History   Administered Date(s) Administered     Influenza high dose, seasonal 12/23/2015, 10/26/2017     Influenza, inj, historic,unspecified 11/05/2007, 10/21/2008, 10/01/2010, 10/03/2016     Pneumo Conj 13-V (2010&after) 02/20/2015     Pneumo Polysac 23-V 07/15/2003     Td,adult,historic,unspecified 10/21/2008     ZOSTER 06/17/2011       Exam Chest clear to auscultation and percussion.  Heart tones regular rhythm without murmur rub or gallop.  Abdomen soft nontender no organomegaly.  No peritoneal signs.  Extremities free of edema cyanosis or clubbing.  Neck veins nondistended no thyromegaly or scleral icterus noted, carotids full.  Skin warm and dry easily conversant good spirited.  Normal intelligence.  Neurologically intact no gross localizing findings.  Nasal rales rhonchi heard both lung fields anteriorly posteriorly.  No central acrocyanosis O2 sats today 96%.    Assessment and Plan  COPD with exacerbation.  Suggest increase prednisone to 20 mg daily for the next 7 days call 1 week regarding status.  May be a candidate for doxycycline therapy.    Multiple drug allergies including ciprofloxacin and cephalexin.    Crohn's disease followed by [facility] gastroenterologist Dr. ESPINOZA    C. difficile associated colitis or inflammation with diarrhea.  ×2.  Currently asymptomatic.  Leery about starting antibiotics and this dividual who is immunocompromised and has multiple drug allergies as listed above ciprofloxacin and cephalexin.  Doxycycline may be an appropriate choice because he has tolerated it in the past and OT antibiotics available it may be least apt to cause C. difficile seal associated colitis.  Discussed in detail with the patient and wife Lamar in attendance a retired nurse.    Time: total time spent with the patient was 40 minutes of which >50% was spent in  counseling and coordination of care    The following high BMI interventions were performed this visit: encouragement to exercise    Darrick Trammell MD    Patient Active Problem List   Diagnosis     Benign Prostatic Hypertrophy     Hyperlipidemia     Crohn's (Granulomatous) Colitis     Blindness of one eye     Essential tremor     COPD (chronic obstructive pulmonary disease)     S/P total hip arthroplasty     Hip osteoarthritis     Rotator cuff tear, right     Postoperative pain     Dislocated elbow     Elbow dislocation     Essential hypertension with goal blood pressure less than 140/90     Crohn's disease without complication, unspecified gastrointestinal tract location     GERD without esophagitis     Dislocation, elbow

## 2021-06-14 NOTE — PROGRESS NOTES
Assessment/Plan:        Diagnoses and all orders for this visit:    Simple chronic bronchitis  -     predniSONE (DELTASONE) 20 MG tablet; Take 1 tablet (20 mg total) by mouth daily.  Dispense: 10 tablet; Refill: 0      83-year-old man with recurrent C. difficile, chronic bronchitis, essential tremor and anxiety.  He is significantly improved.  His fatigue and shortness of breath was multifactorial and related to deconditioning, malnutrition, lung disease.  Anticipate he will slowly improved.    He will continue his anoro inhaler and Mucinex.  He will continue to increase his activity.    Emergency Plan    If you have a chest cold or increased sputum, cough.    Start prednisone 20 mg daily    If you are not improved after 4-5 days, OR if you have continued fever, sputum production    Start Azithromycin (Zpack, 5 days), and oral vancomycin 125 mg 4 times daily for 2 weeks.    You can just call us and we will phone this in.    Oxygen Plan    We can stop your portable oxygen.    We will check an over night oxygen test at home, and see how you feel afterwards as well.          Subjective:    Patient ID: Tyron Sharma is a 83 y.o. male.    HPI Comments:   83-year-old man here for follow-up of chronic bronchitis with persistent exacerbation.  His breathing is improved.  He is still more short of breath with exertion and improves with rest.  No other provocative or palliative factors.  He is no longer having any anxiety issues.  Symptoms localized to his chest.  Pertinent positives include sputum production which is helped by Mucinex.  Pertinent negatives include no fever or hemoptysis.  He is no longer having any abdominal symptoms.  He took vancomycin along with his antibiotics and he tolerated this well.  This decrease his abdominal symptoms.    He is now working on increasing his activity.  Improving his energy.  Improving his nutrition.  He is pleased with how he is feeling.      Review of Systems : Positive for  shortness of breath cough difficulty urinating weakness joint pain activity change and fatigue and congestion.  The remainder of 14 system review systems is negative.        Objective:    Physical Exam   Constitutional: He is oriented to person, place, and time. He appears well-developed and well-nourished. No distress.     Eyes: Right eye exhibits no discharge. Left eye exhibits no discharge. No scleral icterus.   Right eye prosthesis   Neck: Normal range of motion. No JVD present. No tracheal deviation present. No thyromegaly present.   Cardiovascular: Normal rate and regular rhythm.  Exam reveals no gallop and no friction rub.    No murmur heard.  Pulmonary/Chest: Effort normal. No stridor. No respiratory distress. He has no wheezes. He has small rales.  Abdominal: Soft.   Musculoskeletal: He exhibits no edema or tenderness.   Lymphadenopathy:     He has no cervical adenopathy.   Neurological: He is alert and oriented to person, place, and time. No cranial nerve deficit.   Skin: Skin is warm and dry. No rash noted. He is not diaphoretic. No erythema. No pallor.   Psychiatric: He has a normal mood and affect. His behavior is normal. Judgment and thought content normal.           Current Outpatient Prescriptions on File Prior to Visit   Medication Sig Dispense Refill     acetaminophen (TYLENOL) 500 MG tablet Take 100 mg by mouth 2 (two) times a day.       artificial tears,hypromellose, (GENTEAL) 0.3 % Drop Administer 1 drop to both eyes 4 (four) times a day as needed (dry eye).       clindamycin (CLEOCIN T) 1 % lotion Apply 1 application topically 2 (two) times a day as needed. prn       D3/E/SE/SOY ISOFL/TOCOPH/LYCOP (PROSTATE 2.4 ORAL) Take 2 tablets by mouth daily.        diazePAM (VALIUM) 5 MG tablet Take 2.5 mg by mouth 2 (two) times a day.        esomeprazole magnesium 20 mg TbEC Take 1 Tablet 24 Hour Sustained Release by mouth daily.       finasteride (PROSCAR) 5 mg tablet Take 5 mg by mouth daily.        fluticasone (FLONASE) 50 mcg/actuation nasal spray 1 with each nostril twice daily. (Patient taking differently: 1 spray into each nostril 2 (two) times a day. 1 with each nostril twice daily.) 16 g 3     ketoconazole (NIZORAL) 2 % shampoo Apply 1 application topically as needed for itching. Apply to damp skin, lather, leave on 5 minutes, and rinse       lactobacillus rhamnosus GG (CULTURELLE) 10-15 Billion cell capsule Take 1 capsule by mouth daily.       predniSONE (DELTASONE) 5 MG tablet Take 5 mg by mouth daily.       propranolol (INDERAL) 20 MG tablet Take 40 mg by mouth 3 (three) times a day.        saliva substitution combo no.9 (BIOTENE DRY MOUTH ORAL RINSE) Mwsh 5 mL by Mucous Membrane route as needed (dry mouth).        senna-docusate (PERICOLACE) 8.6-50 mg tablet Take 1 tablet by mouth 2 (two) times a day as needed for constipation.       tamsulosin (FLOMAX) 0.4 mg Cp24 Take 1 capsule (0.4 mg total) by mouth daily after supper. 100 capsule 5     traMADol (ULTRAM) 50 mg tablet TAKE ONE TABLET BY MOUTH ONCE DAILY AS NEEDED 30 tablet 1     traZODone (DESYREL) 100 MG tablet TAKE ONE TABLET BY MOUTH ONCE DAILY AT BEDTIME 90 tablet 3     furosemide (LASIX) 20 MG tablet Take 1 tablet (20 mg total) by mouth daily for 7 days. 7 tablet 0     umeclidinium-vilanterol (ANORO ELLIPTA) 62.5-25 mcg/actuation inhaler Inhale 1 puff daily. 1 Inhaler 6     [DISCONTINUED] albuterol (PROVENTIL) 2.5 mg /3 mL (0.083 %) nebulizer solution Take 3 mL (2.5 mg total) by nebulization every 6 (six) hours as needed for wheezing. 90 vial 11     No current facility-administered medications on file prior to visit.      /56  Pulse (!) 58  Resp 18  Wt 178 lb (80.7 kg)  SpO2 95% Comment: RA at rest  BMI 27.06 kg/m2    Medical History  Active Ambulatory (Non-Hospital) Problems    Diagnosis     Dislocation, elbow     Elbow dislocation     Essential hypertension with goal blood pressure less than 140/90     Crohn's disease without  complication, unspecified gastrointestinal tract location     GERD without esophagitis     Dislocated elbow     Postoperative pain     Rotator cuff tear, right     S/P total hip arthroplasty     Hip osteoarthritis     COPD (chronic obstructive pulmonary disease)     Blindness of one eye     Essential tremor     Benign Prostatic Hypertrophy     Hyperlipidemia     Crohn's (Granulomatous) Colitis     Past Medical History:   Diagnosis Date     Anxiety      Arthritis      Cancer      Crohn's disease      Emphysema of lung      Essential tremor      ETOH abuse      GERD (gastroesophageal reflux disease)      Hyperlipemia      Hypertension      Osteoporosis      S/P deep brain stimulator placement      Tremor of right hand         Surgical History  He  has a past surgical history that includes prothetic eye (Right, 1954); [place] hole w/ stereotactic insertion of DBS leads / intraop microelectrode recording (Bilateral); Appendectomy; Brain surgery; Breast surgery; Colon surgery; Cholecystectomy; Eye surgery; Spine surgery; pr shldr arthroscop,surg,w/rotat cuff repr (Right, 6/1/2016); pr fix collat lig,mc-p jt,i-p jt (Right, 10/27/2016); Elbow fracture surgery (Right, 11/10/2016); and pr remove extern bone fix dev w anesth (Right, 1/12/2017).       Social History  Reviewed, and he still lives at home with his wife.   Allergies  Allergies   Allergen Reactions     Ciprofloxacin Diarrhea and Nausea Only     Keflex [Cephalexin] Diarrhea and Nausea Only                              Data Review - imaging, labs, and ekgs listed below were reviewed by me.  Chest XRay and chest CT images and EKG tracings interpreted personally.     Past Labs  Office Visit on 10/12/2017   Component Date Value     WBC 10/12/2017 5.7      RBC 10/12/2017 4.72      Hemoglobin 10/12/2017 14.2      Hematocrit 10/12/2017 42.5      MCV 10/12/2017 90      MCH 10/12/2017 30.0      MCHC 10/12/2017 33.3      RDW 10/12/2017 13.6      Platelets 10/12/2017 223       MPV 10/12/2017 7.6      Sodium 10/12/2017 141      Potassium 10/12/2017 4.4      Chloride 10/12/2017 103      CO2 10/12/2017 25      Anion Gap, Calculation 10/12/2017 13      Glucose 10/12/2017 102      BUN 10/12/2017 17      Creatinine 10/12/2017 1.41*     GFR MDRD Af Amer 10/12/2017 58*     GFR MDRD Non Af Amer 10/12/2017 48*     Bilirubin, Total 10/12/2017 0.6      Calcium 10/12/2017 9.8      Protein, Total 10/12/2017 6.8      Albumin 10/12/2017 2.9*     Alkaline Phosphatase 10/12/2017 83      AST 10/12/2017 15      ALT 10/12/2017 13      25 minutes spent, greater than 50% counseling and coordination of care.

## 2021-06-15 NOTE — PROGRESS NOTES
Office Visit - Follow up    Tyron Sharma   83 y.o. male    Date of Visit: 1/29/2018    Chief Complaint   Patient presents with     COPD     Hypertension     Arthritis     hands  painful       Subjective: Crohn's disease or granulomatous colitis limited to the colon status post resected no sign of recurrence followed by [facility] gastroenterologist Dr. PRABHAKAR.    COPD and arthritis in hands painful.  Patient has bronchial-like symptoms and is fearful of being on antibiotics.  The patient has purulent yellow-green sputum.  The patient previously had had C. difficile associated colitis.  Wish to have prednisone higher dose for a brief period to see if this quells inflammation in the bronchial tubes okay for 20 mg daily for 70.  Then back to 5 mg of prednisone daily.    Chronic pain syndrome caution regarding expect of use and safe use of tramadol.  I did discuss extra strength Tylenol or arthritis strength Tylenol 1300 mg 3 times a day 650 mg tablets or caplets take 2 3 times a day.    Tramadol would be permissible but cautioned patient regarding excessive use of same as tramadol is a week and mild opiate but addiction is still a potential.  Patient understands.    No blood in stool or urine no chest pain or shortness of breath medication list reviewed the patient's pain is limited to his hands and osteoarthritis there is no active synovitis redness or warmth going on.  He is loss of site of vision in one eye.  Atraumatic.  Remote past.    ROS: A comprehensive review of systems was performed and was otherwise negative    Medications:  Prior to Admission medications    Medication Sig Start Date End Date Taking? Authorizing Provider   acetaminophen (TYLENOL) 500 MG tablet Take 100 mg by mouth 2 (two) times a day.   Yes PROVIDER, HISTORICAL   artificial tears,hypromellose, (GENTEAL) 0.3 % Drop Administer 1 drop to both eyes 4 (four) times a day as needed (dry eye).   Yes PROVIDER, HISTORICAL   clindamycin (CLEOCIN T) 1 %  lotion Apply 1 application topically 2 (two) times a day as needed. prn   Yes PROVIDER, HISTORICAL   D3/E/SE/SOY ISOFL/TOCOPH/LYCOP (PROSTATE 2.4 ORAL) Take 2 tablets by mouth daily.    Yes PROVIDER, HISTORICAL   diazePAM (VALIUM) 5 MG tablet Take 2.5 mg by mouth 2 (two) times a day.    Yes PROVIDER, HISTORICAL   finasteride (PROSCAR) 5 mg tablet Take 5 mg by mouth daily.   Yes PROVIDER, HISTORICAL   fluticasone (FLONASE) 50 mcg/actuation nasal spray 1 with each nostril twice daily.  Patient taking differently: 1 spray into each nostril 2 (two) times a day. 1 with each nostril twice daily. 4/15/16  Yes Darrick Trammell MD   ketoconazole (NIZORAL) 2 % shampoo Apply 1 application topically as needed for itching. Apply to damp skin, lather, leave on 5 minutes, and rinse   Yes PROVIDER, HISTORICAL   lactobacillus rhamnosus GG (CULTURELLE) 10-15 Billion cell capsule Take 1 capsule by mouth daily.   Yes PROVIDER, HISTORICAL   omeprazole (PRILOSEC) 20 MG capsule TAKE ONE CAPSULE BY MOUTH ONCE DAILY 12/8/17  Yes Darrick Trammell MD   predniSONE (DELTASONE) 5 MG tablet Take 1 tablet (5 mg total) by mouth daily. 1/29/18  Yes Darrick Trammell MD   propranolol (INDERAL) 20 MG tablet Take 40 mg by mouth 3 (three) times a day.    Yes PROVIDER, HISTORICAL   saliva substitution combo no.9 (BIOTENE DRY MOUTH ORAL RINSE) Mwsh 5 mL by Mucous Membrane route as needed (dry mouth).    Yes PROVIDER, HISTORICAL   traMADol (ULTRAM) 50 mg tablet Take 1 tablet (50 mg total) by mouth every 8 (eight) hours as needed for pain. 1/29/18  Yes Darrick Trammell MD   traZODone (DESYREL) 100 MG tablet TAKE ONE TABLET BY MOUTH ONCE DAILY AT BEDTIME 11/1/17  Yes Darrick Trammell MD   predniSONE (DELTASONE) 5 MG tablet Take 5 mg by mouth daily.  1/29/18 Yes PROVIDER, HISTORICAL   traMADol (ULTRAM) 50 mg tablet Take 1 tablet (50 mg total) by mouth every 8 (eight) hours as needed for pain. 11/27/17 1/29/18 Yes Darrick Trammell MD    esomeprazole magnesium 20 mg TbEC Take 1 Tablet 24 Hour Sustained Release by mouth daily.    PROVIDER, HISTORICAL   furosemide (LASIX) 20 MG tablet Take 1 tablet (20 mg total) by mouth daily for 7 days. 9/21/17 9/28/17  Drew Duran MD   predniSONE (DELTASONE) 20 MG tablet Take 1 tablet (20 mg total) by mouth daily. 1/29/18   Darrick Trammell MD   senna-docusate (PERICOLACE) 8.6-50 mg tablet Take 1 tablet by mouth 2 (two) times a day as needed for constipation.    PROVIDER, HISTORICAL   tamsulosin (FLOMAX) 0.4 mg Cp24 TAKE ONE CAPSULE(0.4MG TOTAL) BY MOUTH ONCE DAILY AFTER SUPPER 11/20/17   Darrick Trammell MD   umeclidinium-vilanterol (ANORO ELLIPTA) 62.5-25 mcg/actuation inhaler Inhale 1 puff daily. 9/29/17 10/29/17  Drew Duran MD   predniSONE (DELTASONE) 20 MG tablet Take 1 tablet (20 mg total) by mouth daily. 11/27/17 1/29/18  Darrick Trammell MD       Allergies:   Allergies   Allergen Reactions     Ciprofloxacin Diarrhea and Nausea Only     Keflex [Cephalexin] Diarrhea and Nausea Only       Immunizations:   Immunization History   Administered Date(s) Administered     Influenza high dose, seasonal 12/23/2015, 10/26/2017     Influenza, inj, historic,unspecified 11/05/2007, 10/21/2008, 10/01/2010, 10/03/2016     Pneumo Conj 13-V (2010&after) 02/20/2015     Pneumo Polysac 23-V 07/15/2003     Td,adult,historic,unspecified 10/21/2008     ZOSTER 06/17/2011       Exam Chest clear to auscultation and percussion.  Heart tones regular rhythm without murmur rub or gallop.  Abdomen soft nontender no organomegaly.  No peritoneal signs.  Extremities free of edema cyanosis or clubbing.  Neck veins nondistended no thyromegaly or scleral icterus noted, carotids full.  Skin warm and dry easily conversant good spirited.  Normal intelligence.  Neurologically intact no gross localizing findings.  Enucleation of one night from trauma.  Osteoarthritic changes in hands without active synovitis redness  or warmth Heberden's nodes are prominently displayed with subluxation and deformity.    Assessment and Plan  Crohn's disease with granulomatous colitis status post resection no clinical evidence of recurrence followed by Dr. Montgomery.    Bronchitis chronic okay for short course of higher dose of prednisone 20 mg daily for 70.  Then back to 5 mg daily.    Chronic pain syndrome osteoarthritis tramadol is permissible but cautioned regarding excessive use of same as it at risk for habituation.    Multiple drug allergies including ciprofloxacin and cephalexin.    COPD.    Hypertension controlled.  124/60 pulse 62 respirations 98%.    Refill prednisone 20 mg daily for 70.  And tramadol 50 mg with extra strength Tylenol or arthritis strength Tylenol 1300 mg 3 times a day for the latter with tramadol should be effective for pain of osteoarthritis which is chronic.  We had a lengthy discussion.    Time: total time spent with the patient was 25 minutes of which >50% was spent in counseling and coordination of care    The following high BMI interventions were performed this visit: encouragement to exercise return to clinic 6 weeks time.    Darrick Trammell MD    Patient Active Problem List   Diagnosis     Benign Prostatic Hypertrophy     Hyperlipidemia     Crohn's (Granulomatous) Colitis     Blindness of one eye     Essential tremor     COPD (chronic obstructive pulmonary disease)     S/P total hip arthroplasty     Hip osteoarthritis     Rotator cuff tear, right     Postoperative pain     Dislocated elbow     Elbow dislocation     Essential hypertension with goal blood pressure less than 140/90     Crohn's disease without complication, unspecified gastrointestinal tract location     GERD without esophagitis     Dislocation, elbow

## 2021-06-16 NOTE — PROGRESS NOTES
Assessment/Plan:        Diagnoses and all orders for this visit:    Simple chronic bronchitis  -     albuterol (PROVENTIL) 2.5 mg /3 mL (0.083 %) nebulizer solution; Take 3 mL (2.5 mg total) by nebulization every 6 (six) hours as needed for wheezing.  Dispense: 90 vial; Refill: 11    Dyspnea on exertion      83-year-old man with recurrent C. difficile, chronic bronchitis, essential tremor and anxiety.  He he has maintained his previous improvement.  His fatigue and shortness of breath was multifactorial and related to deconditioning, malnutrition, lung disease.  Although he feels like he has plateaued I am hopeful he can continue to improve slowly.    Continue an oral inhaler.  We will add albuterol nebulizers.    To need to avoid antibiotics unless absolutely necessary.    Continue slow increase in activity to improve conditioning.    Although he had a significant period of time with low oxygen and never went below the mid 80s.  Overall the benefit of oxygen in the setting is minimal.  This is why we stopped it per          Subjective:    Patient ID: Tyron Sharma is a 83 y.o. male.    HPI Comments:   83-year-old man here for follow-up of chronic bronchitis his breathing is improved over the last few months.  He still quite short of breath with exertion but this improves with rest.  He no longer uses oxygen.  He still is very limited in strength has poor muscle mass.  Symptoms localized overall to his chest.  Pertinent positives include fatigue, diarrhea.  Pertinent negatives include no fever or hemoptysis.  He had a flare of cough and sputum which was treated with increased prednisone by Dr. Trammell.    He uses his in cruise.  He plays quite a bit of money for this and is interested in decreasing the cost if possible.    Although his diarrhea is back he still is improving in weight and appetite.      Review of Systems : Negative ×14 systems except for listed in HPI.    Objective:    Physical Exam    Constitutional: He is oriented to person, place, and time. He appears well-developed and well-nourished. No distress.     Eyes: Right eye exhibits no discharge. Left eye exhibits no discharge. No scleral icterus.   Right eye prosthesis   Neck: Normal range of motion. No JVD present. No tracheal deviation present. No thyromegaly present.   Cardiovascular: Normal rate and regular rhythm.  Exam reveals no gallop and no friction rub.    No murmur heard.  Pulmonary/Chest: Effort normal. No stridor. No respiratory distress. He has no wheezes.  Overall his lung exam has improved.  Abdominal: Soft.   Musculoskeletal: He exhibits no edema or tenderness.   Lymphadenopathy:     He has no cervical adenopathy.   Neurological: He is alert and oriented to person, place, and time. No cranial nerve deficit.   Skin: Skin is warm and dry. No rash noted. He is not diaphoretic. No erythema. No pallor.   Psychiatric: He has a normal mood and affect. His behavior is normal. Judgment and thought content normal.           Current Outpatient Prescriptions on File Prior to Visit   Medication Sig Dispense Refill     acetaminophen (TYLENOL) 500 MG tablet Take 100 mg by mouth 2 (two) times a day.       artificial tears,hypromellose, (GENTEAL) 0.3 % Drop Administer 1 drop to both eyes 4 (four) times a day as needed (dry eye).       clindamycin (CLEOCIN T) 1 % lotion Apply 1 application topically 2 (two) times a day as needed. prn       D3/E/SE/SOY ISOFL/TOCOPH/LYCOP (PROSTATE 2.4 ORAL) Take 2 tablets by mouth daily.        diazePAM (VALIUM) 5 MG tablet Take 2.5 mg by mouth 2 (two) times a day.        finasteride (PROSCAR) 5 mg tablet Take 5 mg by mouth daily.       fluticasone (FLONASE) 50 mcg/actuation nasal spray 1 with each nostril twice daily. (Patient taking differently: 1 spray into each nostril 2 (two) times a day. 1 with each nostril twice daily.) 16 g 3     ketoconazole (NIZORAL) 2 % shampoo Apply 1 application topically as needed for  itching. Apply to damp skin, lather, leave on 5 minutes, and rinse       lactobacillus rhamnosus GG (CULTURELLE) 10-15 Billion cell capsule Take 1 capsule by mouth daily.       omeprazole (PRILOSEC) 20 MG capsule TAKE ONE CAPSULE BY MOUTH ONCE DAILY 90 capsule 1     predniSONE (DELTASONE) 5 MG tablet Take 1 tablet (5 mg total) by mouth daily. 100 tablet 3     propranolol (INDERAL) 20 MG tablet Take 40 mg by mouth 3 (three) times a day.        saliva substitution combo no.9 (BIOTENE DRY MOUTH ORAL RINSE) Mwsh 5 mL by Mucous Membrane route as needed (dry mouth).        senna-docusate (PERICOLACE) 8.6-50 mg tablet Take 1 tablet by mouth 2 (two) times a day as needed for constipation.       tamsulosin (FLOMAX) 0.4 mg Cp24 TAKE ONE CAPSULE(0.4MG TOTAL) BY MOUTH ONCE DAILY AFTER SUPPER 90 capsule 6     traMADol (ULTRAM) 50 mg tablet Take 1 tablet (50 mg total) by mouth every 8 (eight) hours as needed for pain. 30 tablet 1     traZODone (DESYREL) 100 MG tablet TAKE ONE TABLET BY MOUTH ONCE DAILY AT BEDTIME 90 tablet 3     furosemide (LASIX) 20 MG tablet Take 1 tablet (20 mg total) by mouth daily for 7 days. 7 tablet 0     umeclidinium-vilanterol (ANORO ELLIPTA) 62.5-25 mcg/actuation inhaler Inhale 1 puff daily. 1 Inhaler 6     [DISCONTINUED] esomeprazole magnesium 20 mg TbEC Take 1 Tablet 24 Hour Sustained Release by mouth daily.       [DISCONTINUED] predniSONE (DELTASONE) 20 MG tablet Take 1 tablet (20 mg total) by mouth daily. 20 tablet 12     No current facility-administered medications on file prior to visit.      /60  Pulse 66  Resp 17  Wt 183 lb (83 kg)  SpO2 92% Comment: RA  BMI 27.83 kg/m2    Medical History  Active Ambulatory (Non-Hospital) Problems    Diagnosis     Dislocation, elbow     Elbow dislocation     Essential hypertension with goal blood pressure less than 140/90     Crohn's disease without complication, unspecified gastrointestinal tract location     GERD without esophagitis     Dislocated  elbow     Postoperative pain     Rotator cuff tear, right     S/P total hip arthroplasty     Hip osteoarthritis     COPD (chronic obstructive pulmonary disease)     Blindness of one eye     Essential tremor     Benign Prostatic Hypertrophy     Hyperlipidemia     Crohn's (Granulomatous) Colitis     Past Medical History:   Diagnosis Date     Anxiety      Arthritis      Cancer      Crohn's disease      Emphysema of lung      Essential tremor      ETOH abuse      GERD (gastroesophageal reflux disease)      Hyperlipemia      Hypertension      Osteoporosis      S/P deep brain stimulator placement      Tremor of right hand         Surgical History  He  has a past surgical history that includes prothetic eye (Right, 1954); Marycarmen hole w/ stereotactic insertion of DBS leads / intraop microelectrode recording (Bilateral); Appendectomy; Brain surgery; Breast surgery; Colon surgery; Cholecystectomy; Eye surgery; Spine surgery; pr shldr arthroscop,surg,w/rotat cuff repr (Right, 6/1/2016); pr fix collat lig,mc-p jt,i-p jt (Right, 10/27/2016); Elbow fracture surgery (Right, 11/10/2016); and pr remove extern bone fix dev w anesth (Right, 1/12/2017).       Social History  Reviewed, and he still lives at home with his wife 2/27.   Allergies  Allergies   Allergen Reactions     Ciprofloxacin Diarrhea and Nausea Only     Keflex [Cephalexin] Diarrhea and Nausea Only                              Data Review - imaging, labs, and ekgs listed below were reviewed by me.  Chest XRay and chest CT images and EKG tracings interpreted personally.     Past Labs  No visits with results within 2 Month(s) from this visit.  Latest known visit with results is:    Office Visit on 10/12/2017   Component Date Value     WBC 10/12/2017 5.7      RBC 10/12/2017 4.72      Hemoglobin 10/12/2017 14.2      Hematocrit 10/12/2017 42.5      MCV 10/12/2017 90      MCH 10/12/2017 30.0      MCHC 10/12/2017 33.3      RDW 10/12/2017 13.6      Platelets 10/12/2017 223       MPV 10/12/2017 7.6      Sodium 10/12/2017 141      Potassium 10/12/2017 4.4      Chloride 10/12/2017 103      CO2 10/12/2017 25      Anion Gap, Calculation 10/12/2017 13      Glucose 10/12/2017 102      BUN 10/12/2017 17      Creatinine 10/12/2017 1.41*     GFR MDRD Af Amer 10/12/2017 58*     GFR MDRD Non Af Amer 10/12/2017 48*     Bilirubin, Total 10/12/2017 0.6      Calcium 10/12/2017 9.8      Protein, Total 10/12/2017 6.8      Albumin 10/12/2017 2.9*     Alkaline Phosphatase 10/12/2017 83      AST 10/12/2017 15      ALT 10/12/2017 13

## 2021-06-16 NOTE — PROGRESS NOTES
Office Visit - Follow up    Tyron Sharma   83 y.o. male    Date of Visit: 3/20/2018    Chief Complaint   Patient presents with     COPD     Hypertension       Subjective: COPD and exacerbation.  Helped by higher dose of prednisone 50 mg daily for 7-10 days.  No antibiotics were prescribed because the patient has a past health history of C. difficile toxin associated colitis.  The patient's current prednisone dose should be 5 mg daily.  He has been on this for Crohn's disease and he has been followed by Dr. Montgomery from the [facility] [place] gastroenterologist in this regard.    No blood in stool or urine or sputum.  Medication list reviewed generally well-tolerated.  Allergies to ciprofloxacin and cephalexin are noted.    Chronic tramadol use for diffuse pain related to osteoarthritis in his upper extremities neck shoulder back legs and feet quite disabling.    ROS: A comprehensive review of systems was performed and was otherwise negative    Medications:  Prior to Admission medications    Medication Sig Start Date End Date Taking? Authorizing Provider   acetaminophen (TYLENOL) 500 MG tablet Take 100 mg by mouth 2 (two) times a day.   Yes PROVIDER, HISTORICAL   albuterol (PROVENTIL) 2.5 mg /3 mL (0.083 %) nebulizer solution Take 3 mL (2.5 mg total) by nebulization every 6 (six) hours as needed for wheezing. 2/27/18  Yes Drew Duran MD   artificial tears,hypromellose, (GENTEAL) 0.3 % Drop Administer 1 drop to both eyes 4 (four) times a day as needed (dry eye).   Yes PROVIDER, HISTORICAL   clindamycin (CLEOCIN T) 1 % lotion Apply 1 application topically 2 (two) times a day as needed. prn   Yes PROVIDER, HISTORICAL   D3/E/SE/SOY ISOFL/TOCOPH/LYCOP (PROSTATE 2.4 ORAL) Take 2 tablets by mouth daily.    Yes PROVIDER, HISTORICAL   diazePAM (VALIUM) 5 MG tablet Take 2.5 mg by mouth 2 (two) times a day.    Yes PROVIDER, HISTORICAL   finasteride (PROSCAR) 5 mg tablet Take 5 mg by mouth daily.   Yes  PROVIDER, HISTORICAL   fluticasone (FLONASE) 50 mcg/actuation nasal spray 1 with each nostril twice daily.  Patient taking differently: 1 spray into each nostril 2 (two) times a day. 1 with each nostril twice daily. 4/15/16  Yes Darrick Trammell MD   lactobacillus rhamnosus GG (CULTURELLE) 10-15 Billion cell capsule Take 1 capsule by mouth daily.   Yes PROVIDER, HISTORICAL   omeprazole (PRILOSEC) 20 MG capsule TAKE ONE CAPSULE BY MOUTH ONCE DAILY 12/8/17  Yes Darrick Trammell MD   propranolol (INDERAL) 20 MG tablet Take 40 mg by mouth 3 (three) times a day.    Yes PROVIDER, HISTORICAL   tamsulosin (FLOMAX) 0.4 mg Cp24 TAKE ONE CAPSULE(0.4MG TOTAL) BY MOUTH ONCE DAILY AFTER SUPPER 11/20/17  Yes Darrick Trammell MD   traMADol (ULTRAM) 50 mg tablet Take 1 tablet (50 mg total) by mouth every 8 (eight) hours as needed for pain. 1/29/18  Yes Darrick Trammell MD   traZODone (DESYREL) 100 MG tablet TAKE ONE TABLET BY MOUTH ONCE DAILY AT BEDTIME 11/1/17  Yes Darrick Trammell MD   furosemide (LASIX) 20 MG tablet Take 1 tablet (20 mg total) by mouth daily for 7 days. 9/21/17 9/28/17  Drew Duran MD   ketoconazole (NIZORAL) 2 % shampoo Apply 1 application topically as needed for itching. Apply to damp skin, lather, leave on 5 minutes, and rinse    PROVIDER, HISTORICAL   predniSONE (DELTASONE) 5 MG tablet Take 1 tablet (5 mg total) by mouth daily. 1/29/18   Darrick Trammell MD   saliva substitution combo no.9 (BIOTENE DRY MOUTH ORAL RINSE) Mwsh 5 mL by Mucous Membrane route as needed (dry mouth).     PROVIDER, HISTORICAL   senna-docusate (PERICOLACE) 8.6-50 mg tablet Take 1 tablet by mouth 2 (two) times a day as needed for constipation.    PROVIDER, HISTORICAL   umeclidinium-vilanterol (ANORO ELLIPTA) 62.5-25 mcg/actuation inhaler Inhale 1 puff daily. 9/29/17 10/29/17  Drew Duran MD       Allergies:   Allergies   Allergen Reactions     Ciprofloxacin Diarrhea and Nausea Only     Keflex  [Cephalexin] Diarrhea and Nausea Only       Immunizations:   Immunization History   Administered Date(s) Administered     Influenza high dose, seasonal 12/23/2015, 10/26/2017     Influenza, inj, historic,unspecified 11/05/2007, 10/21/2008, 10/01/2010, 10/03/2016     Pneumo Conj 13-V (2010&after) 02/20/2015     Pneumo Polysac 23-V 07/15/2003     Td,adult,historic,unspecified 10/21/2008     ZOSTER, LIVE 06/17/2011       Exam Chest clear to auscultation and percussion.  Heart tones regular rhythm without murmur rub or gallop.  Abdomen soft nontender no organomegaly.  No peritoneal signs.  Extremities free of edema cyanosis or clubbing.  Neck veins nondistended no thyromegaly or scleral icterus noted, carotids full.  Skin warm and dry easily conversant good spirited.  Normal intelligence.  Neurologically intact no gross localizing findings.    Assessment and Plan  COPD with Crohn's disease and chronically ill-appearing male 83 years of age.  Allergies multiple to drugs ciprofloxacin and cephalexin.    O2 sats today 92%.    Hypertension controlled 110/60.  Chronic pain syndrome secondary to osteoarthritis diffuse and inflammatory.  Continue tramadol as previously written and ordered cautioned regarding excessive use of same RTC 2-3 months time.    Time: total time spent with the patient was 25 minutes of which >50% was spent in counseling and coordination of care    The following high BMI interventions were performed this visit: encouragement to exercise    Darrick Trammell MD    Patient Active Problem List   Diagnosis     Benign Prostatic Hypertrophy     Hyperlipidemia     Crohn's (Granulomatous) Colitis     Blindness of one eye     Essential tremor     COPD (chronic obstructive pulmonary disease)     S/P total hip arthroplasty     Hip osteoarthritis     Rotator cuff tear, right     Postoperative pain     Dislocated elbow     Elbow dislocation     Essential hypertension with goal blood pressure less than 140/90      Crohn's disease without complication, unspecified gastrointestinal tract location     GERD without esophagitis     Dislocation, elbow

## 2021-06-18 NOTE — LETTER
Letter by Darrick Trammell MD at      Author: Darrick Trammell MD Service: -- Author Type: --    Filed:  Encounter Date: 1/24/2019 Status: (Other)         [facility] INTERNAL MEDICINE  01/24/19    Patient: Tyron Sharma  YOB: 1934  Medical Record Number: [id number]                                                                  Opioid / Opioid Plus Controlled Substance Agreement    I understand that my care provider has prescribed an opioid (narcotic) controlled substance to help manage my condition(s). I am taking this medicine to help me function or work. I know this is strong medicine, and that it can cause serious side effects. Opioid medicine can be sedating, addicting and may cause a dependency on the drug. They can affect my ability to drive or think, and cause depression. They need to be taken exactly as prescribed. Combining opioids with certain medicines or chemicals (such as cocaine, sedatives and tranquilizers, sleeping pills, meth) can be dangerous or even fatal. Also, if I stop opioids suddenly, I may have severe withdrawal symptoms. Last, I understand that opioids do not work for all types of pain nor for all patients. If not helpful, I may be asked to stop them.        The risks, benefits, and side effects of these medicine(s) were explained to me. I agree that:    1. I will take part in other treatments as advised by my care team. This may be psychiatry or counseling, physical therapy, behavioral therapy, group treatment or a referral to a pain clinic. I will reduce or stop my medicine when my care team tells me to do so.  2. I will take my medicines as prescribed. I will not change the dose or schedule unless my care team tells me to. There will be no refills if I run out early.  I may be contactedwithout warning and asked to complete a urine drug test or pill count at any time.   3. I will keep all my appointments, and understand this is part of the  monitoring of opioids. My care team may require an office visit for EVERY opioid/controlled substance refill. If I miss appointments or dont follow instructions, my care team may stop my medicine.  4. I will not ask other providers to prescribe controlled substances, and I will not accept controlled substances from other people. If I need another prescribed controlled substance for a new reason, I will tell my care team within 1 business day.  5. I will use one pharmacy to fill all of my controlled substance prescriptions, and it is up to me to make sure that I do not run out of my medicines on weekends or holidays. If my care team is willing to refill my opioid prescription without a visit, I must request refills only during office hours, refills may take up to 3 days to process, and it may take up to 5 to 7 days for my medicine to be mailed and ready at my pharmacy. Prescriptions will not be mailed anywhere except my pharmacy.        [id number]  Rev 12/18         Registration to scan to EHR                             Page 1 of 2               Controlled Substance Agreement Opioid        [facility] INTERNAL MEDICINE  01/24/19  Patient: Tyron Sharma  YOB: 1934  Medical Record Number: [id number]                                                                  6. I am responsible for my prescriptions. If the medicine/prescription is lost or stolen, it will not be replaced. I also agree not to share controlled substance medicines with anyone.  7. I agree to not use ANY illegal or recreational drugs. This includes marijuana, cocaine, bath salts or other drugs. I agree not to use alcohol unless my care team says I may.          I agree to give urine samples whenever asked. If I dont give a urine sample, the care team may stop my medicine.    8. If I enroll in the [place] Medical Marijuana program, I will tell my care team. I will also sign an agreement to share my medical records with my  care team.   9. I will bring in my list of medicines (or my medicine bottles) each time I come to the clinic.   10. I will tell my care team right away if I become pregnant or have a new medical problem treated outside of my regular clinic.  11. I understand that this medicine can affect my thinking and judgment. It may be unsafe for me to drive, use machinery and do dangerous tasks. I will not do any of these things until I know how the medicine affects me. If my dose changes, I will wait to see how it affects me. I will contact my care team if I have concerns about medicine side effects.    I understand that if I do not follow any of the conditions above, my prescriptions or treatment may be stopped.      I agree that my provider, clinic care team, and pharmacy may work with any city, state or federal law enforcement agency that investigates the misuse, sale, or other diversion of my controlled medicine. I will allow my provider to discuss my care with or share a copy of this agreement with any other treating provider, pharmacy or emergency room where I receive care. I agree to give up (waive) any right of privacy or confidentiality with respect to these consents.     I have read this agreement and have asked questions about anything I did not understand.      ________________________________________________________________________  Patient signature - Date/Time -  Tyron Sharma                                      ________________________________________________________________________  Witness signature                                                            ________________________________________________________________________  Provider signature - Darrick Trammlel MD      [id number]  Rev 12/18         Registration to scan to EHR                         Page 2 of 2                   Controlled Substance Agreement Opioid           Page 1 of 2  Opioid Pain Medicines (also known as Narcotics)  What You  Need to Know    What are opioids?   Opioids are pain medicines that must be prescribed by a doctor.  They are also known as narcotics.    Examples are:     morphine (MS Contin, Sweta)    oxycodone (Oxycontin)    oxycodone and acetaminophen (Percocet)    hydrocodone and acetaminophen (Vicodin, Norco)     fentanyl patch (Duragesic)     hydromorphone (Dilaudid)     methadone     What do opioids do well?   Opioids are best for short-term pain after a surgery or injury. They also work well for cancer pain. Unlike other pain medicines, they do not cause liver or kidney failure or ulcers. They may help some people with long-lasting (chronic) pain.     What do opioids NOT do well?   Opioids never get rid of pain entirely, and they do not work well for most patients with chronic pain. Opioids do not reduce swelling, one of the causes of pain. They also dont work well for nerve pain.                           For informational purposes only.  Not to replace the advice of your care provider.  Copyright [address] [facility]. All right reserved. [employer] [id number] 02/18.      Page 2 of 2    Risks and side effects   Talk to your doctor before you start or decide to keep taking one of these medicines. Side effects include:    Lowering your breathing rate enough to cause death    Overdose, including death, especially if taking higher than prescribed doses    Long-term opioid use    Worse depression symptoms; less pleasure in things you usually enjoy    Feeling tired or sluggish    Slower thoughts or cloudy thinking    Being more sensitive to pain over time; pain is harder to control    Trouble sleeping or restless sleep    Changes in hormone levels (for example, less testosterone)    Changes in sex drive or ability to have sex    Constipation    Unsafe driving    Itching and sweating    Feeling dizzy    Nausea, vomiting and dry mouth    What else should I know about opioids?  When someone takes opioids for too long or  too often, they become dependent. This means that if you stop or reduce the medicine too quickly, you will have withdrawal symptoms.    Dependence is not the same as addiction. Addiction is when people keep using a substance that harms their body, their mind or their relations with others. If you have a history of drug or alcohol abuse, taking opioids can cause a relapse.    Over time, opioids dont work as well. Most people will need higher and higher doses. The higher the dose, the more serious the side effects. We dont know the long-term effects of opioids.      Prescribed opioids aren't the best way to manage chronic pain    Other ways to manage pain include:      Ibuprofen or acetaminophen.  You should always try this first.      Treat health problems that may be causing pain.      acupuncture or massage, deep breathing, meditation, visual imagery, aromatherapy.      Use heat or ice at the pain site      Physical therapy and exercise      Stop smoking      See a counselor or therapist                                                  People who have used opioids for a long time may have a lower quality of life, worse depression, higher levels of pain and more visits to doctors.    Never share your opioids with others. Be sure to store opioids in a secure place, locked if possible.Young children can easily swallow them and overdose.     You can overdose on opioids.  Signs of overdose include decrease or loss of consciousness, slowed breathing, trouble waking and blue lips.  If someone is worried about overdose, they should call 911.    If you are at risk for overdose, you may get naloxone (Narcan, a medicine that reverses the effects of opioids.  If you overdose, a friend or family member can give you Narcan while waiting for the ambulance.  They need to know the signs of overdose and how to give Narcan.    While you're taking opioids:    Don't use alcohol or street drugs. Taking them together can cause  death.    Don't take any of these medicines unless your doctor says its okay.  Taking these with opioids can cause death.    Benzodiazepines (such as lorazepam         or diazepam)    Muscle relaxers (such as cyclobenzaprine)    sleeping pills    other opioids    Safe disposal of opioids  Find your area drug take-back program, your pharmacy mail-back program, buy a special disposal bag (such as Deterra) from your pharmacy or flush them down the toilet.  Use the guidelines at:  www.fda.gov/drugs/resourcesforyou

## 2021-06-18 NOTE — PROGRESS NOTES
Office Visit - Follow up    Tyron Sharma   83 y.o. male    Date of Visit: 6/22/2018    Chief Complaint   Patient presents with     Hypertension     Crohn's Disease     COPD     Medication Questions       Subjective: COPD with history of Crohn's disease hypertension.    Weight is up 12 pounds.  More short of breath.  Has tried to see a pulmonologist per our recommendation but has trouble getting into the [facility] pulmonary medicine section.    Previously had had an appointment with Dr. Drew Duran there at pulmonary medicine [facility] but Dr. Duran is out on a sabbatical leave.    I encouraged him to stick with the [facility] pulmonary medicine section for follow-up.    No chest pain but he is short of breath no blood in sputum stool or urine.  Medic    Medication list reviewed well-tolerated.    Care should be exercised when writing and antibiotics for COPD and exacerbation as he has had 2 occurrences of C. difficile colitis associated with antibiotic prescriptions orally.    Patient requests refill of prednisone 20 to be used cautiously for flares of 7 days for flareups of COPD without antibiotic therapy.    Patient's weight was increased and he is short of breath some edema in his abdomen as well as his legs.  Restart furosemide.  20 mg daily.    ROS: A comprehensive review of systems was performed and was otherwise negative    Medications:  Prior to Admission medications    Medication Sig Start Date End Date Taking? Authorizing Provider   albuterol (PROVENTIL) 2.5 mg /3 mL (0.083 %) nebulizer solution Take 3 mL (2.5 mg total) by nebulization every 6 (six) hours as needed for wheezing. 2/27/18  Yes Drew Duran MD   artificial tears,hypromellose, (GENTEAL) 0.3 % Drop Administer 1 drop to both eyes 4 (four) times a day as needed (dry eye).   Yes PROVIDER, HISTORICAL   clindamycin (CLEOCIN T) 1 % lotion Apply 1 application topically 2 (two) times a day as needed. prn   Yes PROVIDER, HISTORICAL    D3/E/SE/SOY ISOFL/TOCOPH/LYCOP (PROSTATE 2.4 ORAL) Take 2 tablets by mouth daily.    Yes PROVIDER, HISTORICAL   diazePAM (VALIUM) 5 MG tablet Take 2.5 mg by mouth 2 (two) times a day.    Yes PROVIDER, HISTORICAL   finasteride (PROSCAR) 5 mg tablet Take 5 mg by mouth daily.   Yes PROVIDER, HISTORICAL   fluticasone (FLONASE) 50 mcg/actuation nasal spray 1 with each nostril twice daily.  Patient taking differently: 1 spray into each nostril 2 (two) times a day. 1 with each nostril twice daily. 4/15/16  Yes Darrick Trammell MD   lactobacillus rhamnosus GG (CULTURELLE) 10-15 Billion cell capsule Take 1 capsule by mouth daily.   Yes PROVIDER, HISTORICAL   omeprazole (PRILOSEC) 20 MG capsule TAKE ONE CAPSULE BY MOUTH ONCE DAILY 12/8/17  Yes Darrick Trammell MD   predniSONE (DELTASONE) 5 MG tablet Take 1 tablet (5 mg total) by mouth daily. 1/29/18  Yes Darrick Trammell MD   propranolol (INDERAL) 20 MG tablet Take 40 mg by mouth 3 (three) times a day.    Yes PROVIDER, HISTORICAL   saliva substitution combo no.9 (BIOTENE DRY MOUTH ORAL RINSE) Mwsh 5 mL by Mucous Membrane route as needed (dry mouth).    Yes PROVIDER, HISTORICAL   traMADol (ULTRAM) 50 mg tablet Take 1 tablet (50 mg total) by mouth every 8 (eight) hours as needed for pain. 1/29/18  Yes Darrick Trammell MD   traZODone (DESYREL) 100 MG tablet TAKE ONE TABLET BY MOUTH ONCE DAILY AT BEDTIME 11/1/17  Yes Darrick Trammell MD   umeclidinium-vilanterol (ANORO ELLIPTA) 62.5-25 mcg/actuation inhaler Inhale 1 puff daily. 6/12/18 9/10/18 Yes Drew Duran MD   acetaminophen (TYLENOL) 500 MG tablet Take 100 mg by mouth 2 (two) times a day.    PROVIDER, HISTORICAL   albuterol (PROAIR HFA;PROVENTIL HFA;VENTOLIN HFA) 90 mcg/actuation inhaler Inhale 2 puffs every 6 (six) hours as needed for wheezing. 4/24/18   Darrick Trammell MD   furosemide (LASIX) 20 MG tablet Take 1 tablet (20 mg total) by mouth daily for 7 days. 6/22/18 6/29/18  Darrick CHE  MD Jp   ketoconazole (NIZORAL) 2 % shampoo Apply 1 application topically as needed for itching. Apply to damp skin, lather, leave on 5 minutes, and rinse    PROVIDER, HISTORICAL   predniSONE (DELTASONE) 20 MG tablet Take 1 tablet (20 mg total) by mouth daily. 6/22/18   Darrick Trammell MD   senna-docusate (PERICOLACE) 8.6-50 mg tablet Take 1 tablet by mouth 2 (two) times a day as needed for constipation.    PROVIDER, HISTORICAL   tamsulosin (FLOMAX) 0.4 mg Cp24 TAKE ONE CAPSULE(0.4MG TOTAL) BY MOUTH ONCE DAILY AFTER SUPPER 11/20/17   Darrick Trammell MD   furosemide (LASIX) 20 MG tablet Take 1 tablet (20 mg total) by mouth daily for 7 days. 9/21/17 6/22/18  Drew Duran MD       Allergies:   Allergies   Allergen Reactions     Ciprofloxacin Diarrhea and Nausea Only     Keflex [Cephalexin] Diarrhea and Nausea Only       Immunizations:   Immunization History   Administered Date(s) Administered     Influenza high dose, seasonal 12/23/2015, 10/26/2017     Influenza, inj, historic,unspecified 11/05/2007, 10/21/2008, 10/01/2010, 10/03/2016     Pneumo Conj 13-V (2010&after) 02/20/2015     Pneumo Polysac 23-V 07/15/2003     Td,adult,historic,unspecified 10/21/2008     ZOSTER, LIVE 06/17/2011       Exam Chest clear to auscultation and percussion.  Heart tones regular rhythm without murmur rub or gallop.  Abdomen soft nontender no organomegaly.  No peritoneal signs.  Extremities free of edema cyanosis or clubbing.  Neck veins nondistended no thyromegaly or scleral icterus noted, carotids full.  Skin warm and dry easily conversant good spirited.  Normal intelligence.  Neurologically intact no gross localizing findings.  Trace to 1+ edema noted lower extremities abdomen appears distended from fluid neck veins are nondistended no PND.    Assessment and Plan  COPD with volume overload as needed prednisone 20 mg daily for 70.  In lieu of prednisone 5 mg daily.  Restart furosemide 20 mg daily educated patient  regarding avoidance of salt and diet and plenty of potassium in the diet.    Crohn's disease currently quiescent followed by gastroenterology group [facility]    Hypertension control.  120/60.  Pulse 50 and regular O2 sats 93% room air.  RTC 2-3 months time.  Pulmonary medicine consultation in the interlude.    Time: total time spent with the patient was 25 minutes of which >50% was spent in counseling and coordination of care    The following high BMI interventions were performed this visit: encouragement to exercise    Darrick Trammell MD    Patient Active Problem List   Diagnosis     Benign Prostatic Hypertrophy     Hyperlipidemia     Crohn's (Granulomatous) Colitis     Blindness of one eye     Essential tremor     COPD (chronic obstructive pulmonary disease) (H)     S/P total hip arthroplasty     Hip osteoarthritis     Rotator cuff tear, right     Postoperative pain     Dislocated elbow     Elbow dislocation     Essential hypertension with goal blood pressure less than 140/90     Crohn's disease without complication, unspecified gastrointestinal tract location (H)     GERD without esophagitis     Dislocation, elbow

## 2021-06-19 NOTE — PROGRESS NOTES
Assessment and Plan:Tyron Sharma is a 83 y.o. with a past medical history significant for COPD who presents to clinic today for follow up.  He feels his COPD is getting worse and recently tried a short course of prednisone through Dr. Miranda which helped a lot.  He also had a short course of lasix, which may have contributed.  I think we can mimic some of the steroid effect by adding this back into his regimen.  There is a new inhaler that has the same components as his anoro but adds an inhaled steroid.  I will make this substitution today.  I will also refer him to pulmonary rehab as I think he could respond well to this.  Finally, I provided an Aerobika device to help him move his mucous out of his chest when he is unable to get up and go for a walk.  I do suspect a component of this may be related to fluid balance, and have asked Dr. Miranda to consider whether more diuresis could be considered.    1) COPD - stop Anoro when inhaler empty, start Trelegy one puff per day.  Rinse mouth out when done.  Use aerobika for symptoms of chronic bronchitis to aid expectoration.    2) Dyspnea - not requiring oxygen, likely deconditioning.  Should respond to pulmonary rehab, but limited somewhat by physical mobility, will refer to a local program to see if he finds this useful.  3) RTC in 3 months to monitor his progress.           CCx: copd    HPI: Mr. Sharma is a 83 year old male with a history of severe COPD who formerly saw Dr. Duran in our clinic.  Since his last visit he thinks his cough and dyspnea are getting worse.  He is still on anoro and albuterol as needed.  He thinks the albuterol helps some.  He was given a short course of prednisone which help his cough and dyspnea a lot.  Upon stopping it, his symptoms worsened again.  He denies fevers, chills or chest pain.  He has commented his weight has gone up about 10 pounds in recent months, and with a short trial of lasix by Dr. Miranda, his weight went  down 3 pounds and his leg swelling reduced a little.  He did this at the same time as the prednisone, so he is not sure if it helped his breathing or not.  He is mostly limited by his breathing rather than physical immobility.  He finds it hard to exercise because he runs out of air too fast.    ROS:  Review of Systems - History obtained from the patient  General ROS: negative  Psychological ROS: negative  ENT ROS: negative  Allergy and Immunology ROS: negative  Endocrine ROS: negative  Respiratory ROS: positive for - cough, shortness of breath, sputum changes and tachypnea  negative for - pleuritic pain or stridor  Cardiovascular ROS: has lower extremity swelling  Gastrointestinal ROS: no abdominal pain, change in bowel habits, or black or bloody stools  Genito-Urinary ROS: no dysuria, trouble voiding, or hematuria  Musculoskeletal ROS: knee pain  Neurological ROS: no TIA or stroke symptoms  Dermatological ROS: negative      Current Meds:  Current Outpatient Prescriptions   Medication Sig     albuterol (PROAIR HFA;PROVENTIL HFA;VENTOLIN HFA) 90 mcg/actuation inhaler Inhale 2 puffs every 6 (six) hours as needed for wheezing.     albuterol (PROVENTIL) 2.5 mg /3 mL (0.083 %) nebulizer solution Take 3 mL (2.5 mg total) by nebulization every 6 (six) hours as needed for wheezing.     artificial tears,hypromellose, (GENTEAL) 0.3 % Drop Administer 1 drop to both eyes 4 (four) times a day as needed (dry eye).     clindamycin (CLEOCIN T) 1 % lotion Apply 1 application topically 2 (two) times a day as needed. prn     D3/E/SE/SOY ISOFL/TOCOPH/LYCOP (PROSTATE 2.4 ORAL) Take 2 tablets by mouth daily.      diazePAM (VALIUM) 5 MG tablet Take 2.5 mg by mouth 2 (two) times a day.      finasteride (PROSCAR) 5 mg tablet Take 5 mg by mouth daily.     fluticasone (FLONASE) 50 mcg/actuation nasal spray 1 with each nostril twice daily. (Patient taking differently: 1 spray into each nostril 2 (two) times a day. 1 with each nostril twice  daily.)     ketoconazole (NIZORAL) 2 % shampoo Apply 1 application topically as needed for itching. Apply to damp skin, lather, leave on 5 minutes, and rinse     lactobacillus rhamnosus GG (CULTURELLE) 10-15 Billion cell capsule Take 1 capsule by mouth daily.     omeprazole (PRILOSEC) 20 MG capsule TAKE ONE CAPSULE BY MOUTH ONCE DAILY     predniSONE (DELTASONE) 5 MG tablet Take 1 tablet (5 mg total) by mouth daily.     propranolol (INDERAL) 20 MG tablet Take 40 mg by mouth 3 (three) times a day.      saliva substitution combo no.9 (BIOTENE DRY MOUTH ORAL RINSE) Mwsh 5 mL by Mucous Membrane route as needed (dry mouth).      senna-docusate (PERICOLACE) 8.6-50 mg tablet Take 1 tablet by mouth 2 (two) times a day as needed for constipation.     tamsulosin (FLOMAX) 0.4 mg Cp24 TAKE ONE CAPSULE(0.4MG TOTAL) BY MOUTH ONCE DAILY AFTER SUPPER     traMADol (ULTRAM) 50 mg tablet Take 1 tablet (50 mg total) by mouth every 8 (eight) hours as needed for pain.     traZODone (DESYREL) 100 MG tablet TAKE ONE TABLET BY MOUTH ONCE DAILY AT BEDTIME     fluticasone-umeclidinium-vilanterol (TRELEGY ELLIPTA) 100-62.5-25 mcg DsDv inhaler Inhale 1 Inhalation daily.     furosemide (LASIX) 20 MG tablet Take 1 tablet (20 mg total) by mouth daily for 7 days.       Labs:  No results found for this or any previous visit (from the past 72 hour(s)).    I have personally reviewed all pertinent imaging studies and PFT results unless otherwise noted.    Imaging studies:  Xr Elbow Right 2 Vws Portable    Result Date: 1/12/2017  XR ELBOW RIGHT 2 VWS PORTABLE 1/12/2017 2:17 PM INDICATION: Follow-up repair. External fixator removal.    COMPARISON: 11/10/2016 FINDINGS: 45 seconds of fluoroscopic time provided during the procedure. AP, oblique and lateral views obtained with the external fixator in place and then with the external fixator removed.    Xr C Arm Greater Than One Hour    Result Date: 1/12/2017  Please see the Radiology Report for result for  body part of interest.         Physical Exam:  /62  Pulse 60  Resp 20  Wt 196 lb 14.4 oz (89.3 kg)  SpO2 95% Comment: RA  BMI 29.94 kg/m2  General - Well nourished  Ears/Mouth -  OP pink moist, no thrush  Neck - no cervical lymphadenopathy  Lungs - scattered low pitched wheezes with some rales  CVS - regular rhythm with no murmurs, rubs or gallups  Abdomen - soft, NT, ND, NABS  Ext - no cyanosis, clubbing - 1+ pitting edema, L>R  Skin - no rash  Psychology - alert and oriented, answers appropriate        Electronically signed by:    Rick Pulliam MD PhD  [facility] Pulmonary and Critical Care Medicine

## 2021-06-19 NOTE — LETTER
Letter by Rick Pulliam MD at      Author: Rick Pulliam MD Service: -- Author Type: --    Filed:  Encounter Date: 6/3/2019 Status: (Other)         Darrick Trammell MD  [address] [place] [address]                                  Hortencia 3, 2019    Patient: Tyron Sharma   MR Number: [id number]   YOB: 1934   Date of Visit: 6/3/2019     Dear Dr. Jp MD:    Thank you for referring Tyron Sharma to me for evaluation. Below are the relevant portions of my assessment and plan of care.    If you have questions, please do not hesitate to call me. I look forward to following Tyron along with you.    Sincerely,        Rick Pulliam MD          CC  No Recipients  Rick Pulliam MD  6/3/2019 10:20 AM  Sign at close encounter  Assessment and Plan:Tyron Sharma is a 84 y.o. M with a past medical history significant for COPD who presents to clinic today for a sick visit with a COPD exacerbation.  He recently completed a course of zithromax and is finishing 5 days of prednisone and feels somewhat better.  He also had a round of doxycyline and another course of prednisone before this.  He almost certainly has a viral bronchitis, and possibly a little pulmonary edema confounding his presentation.  He is struggling with controlling the mucous in his chest, and could stand better pulmonary toilet to help prevent mucous plugging.    1) Bronchitis - add sterile saline 0.9% to his nebs to do after albuterol to help liberate more mucous.  Should try to get a little exercise as possible to improve pulmonary excursion.  2) Pulmonary edema - try another 5 day course of lasix given the positive response this had before.  3) Cough - short course of tessalon to help abort coughing jegs that wake him up at times.  4) Persistence - I highly doubt he has a high level antibiotic resistant organism, and he has a history of C. Diff so I would like to avoid any  more antibiotic trials.  Will rule out a pneumonia with a CXR, as this would be the only clear reason to represcribe an antibiotic at this point.  5) RTC in 1 week to check in.  If he is feeling better he will call to cancel this appointment      CCx: copd exacerbation    HPI: Mr. Sharma is a 84 year old male with a history of COPD with relatively preserved PFTs who presents for an exacerbation.  He has tried two courses of prednisone and antibiotics and only feels a little better.  He is coughing copious amounts of yellow sputum out, and this sometimes wakes him up.  He is using his nebulizer 3 times a day, and remains on trelegy.  He did try a short course of lasix in April, which he thinks helps.  He is gaining weight on the prednisone and has swelling in his ankles.  He has no fever or chills.  He has been very short of breath with ambulation.    ROS:  Review of Systems - History obtained from the patient  General ROS: negative  Psychological ROS: negative  ENT ROS: negative  Allergy and Immunology ROS: negative  Endocrine ROS: negative  Respiratory ROS: positive for - cough, shortness of breath, sputum changes and tachypnea  negative for - hemoptysis or orthopnea  Cardiovascular ROS: no chest pain or palpitations - worsening swelling in ankles  Gastrointestinal ROS: no abdominal pain, change in bowel habits, or black or bloody stools  Genito-Urinary ROS: no dysuria, trouble voiding, or hematuria  Musculoskeletal ROS: negative  Neurological ROS: no TIA or stroke symptoms  Dermatological ROS: negative      Current Meds:  Current Outpatient Medications   Medication Sig   ? albuterol (PROAIR HFA;PROVENTIL HFA;VENTOLIN HFA) 90 mcg/actuation inhaler Inhale 2 puffs every 6 (six) hours as needed for wheezing.   ? albuterol (PROVENTIL) 2.5 mg /3 mL (0.083 %) nebulizer solution Take 3 mL (2.5 mg total) by nebulization every 6 (six) hours as needed for wheezing.   ? artificial tears,hypromellose, (GENTEAL) 0.3 % Drop  Administer 1 drop to both eyes 4 (four) times a day as needed (dry eye).   ? clindamycin (CLEOCIN T) 1 % lotion Apply 1 application topically 2 (two) times a day as needed. prn   ? D3/E/SE/SOY ISOFL/TOCOPH/LYCOP (PROSTATE 2.4 ORAL) Take 2 tablets by mouth daily.    ? diazePAM (VALIUM) 2 MG tablet Take 2 mg by mouth 2 (two) times a day.   ? diphenoxylate-atropine (LOMOTIL) 2.5-0.025 mg per tablet Take 1 tablet by mouth daily.   ? finasteride (PROSCAR) 5 mg tablet Take 5 mg by mouth daily.   ? fluticasone (FLONASE) 50 mcg/actuation nasal spray 1 with each nostril twice daily. (Patient taking differently: 1 spray into each nostril 2 (two) times a day. 1 with each nostril twice daily.)   ? fluticasone-umeclidinium-vilanterol (TRELEGY ELLIPTA) 100-62.5-25 mcg DsDv inhaler Inhale 1 Inhalation daily.   ? furosemide (LASIX) 20 MG tablet Take 1 tablet (20 mg total) by mouth daily for 7 days.   ? ketoconazole (NIZORAL) 2 % shampoo Apply 1 application topically as needed for itching. Apply to damp skin, lather, leave on 5 minutes, and rinse   ? lactobacillus rhamnosus GG (CULTURELLE) 10-15 Billion cell capsule Take 1 capsule by mouth daily.   ? omeprazole (PRILOSEC) 20 MG capsule TAKE 1 CAPSULE BY MOUTH ONCE DAILY   ? ondansetron (ZOFRAN) 4 MG tablet Take 1 tablet (4 mg total) by mouth daily as needed for nausea.   ? predniSONE (DELTASONE) 20 MG tablet    ? predniSONE (DELTASONE) 20 MG tablet Take 40 mg by mouth daily for 5 days.   ? psyllium with dextrose (METAMUCIL JAR) powder Take by mouth daily.   ? saliva substitution combo no.9 (BIOTENE DRY MOUTH ORAL RINSE) Mwsh 5 mL by Mucous Membrane route as needed (dry mouth).    ? senna-docusate (PERICOLACE) 8.6-50 mg tablet Take 1 tablet by mouth 2 (two) times a day as needed for constipation.   ? tamsulosin (FLOMAX) 0.4 mg cap TAKE ONE CAPSULE(0.4 MG TOTAL) BY MOUTH ONCE DAILY AFTER SUPPER   ? traMADol (ULTRAM) 50 mg tablet TAKE 1 TABLET BY MOUTH EVERY 8 HOURS AS NEEDED FOR PAIN    ? traZODone (DESYREL) 100 MG tablet TAKE ONE TABLET BY MOUTH ONCE DAILY AT BEDTIME       Labs:  No results found for this or any previous visit (from the past 72 hour(s)).    I have personally reviewed all pertinent imaging studies and PFT results unless otherwise noted.    Imaging studies:  Xr Elbow Right 2 Vws Portable    Result Date: 1/12/2017  XR ELBOW RIGHT 2 VWS PORTABLE 1/12/2017 2:17 PM INDICATION: Follow-up repair. External fixator removal.    COMPARISON: 11/10/2016 FINDINGS: 45 seconds of fluoroscopic time provided during the procedure. AP, oblique and lateral views obtained with the external fixator in place and then with the external fixator removed.    Xr C Arm Greater Than One Hour    Result Date: 1/12/2017  Please see the Radiology Report for result for body part of interest.         Physical Exam:  Wt 195 lb 6.4 oz (88.6 kg)   BMI 29.71 kg/m     General - Well nourished, obese  Ears/Mouth -  OP pink moist, no thrush  Neck - no cervical lymphadenopathy  Lungs - Rhonchorous thorughout  CVS - regular rhythm with no murmurs, rubs or gallups  Abdomen - soft, NT, ND, NABS  Ext - no cyanosis, clubbing 1+ edema in his ankles  Skin - no rash  Psychology - alert and oriented, answers appropriate        Electronically signed by:    Rick Pulliam MD PhD  [facility]

## 2021-06-19 NOTE — LETTER
Letter by Darrick Trammell MD at      Author: Darrick Trammell MD Service: -- Author Type: --    Filed:  Encounter Date: 8/7/2019 Status: (Other)         Tyron Sharma  [address] [facility] [address]             August 7, 2019         Dear Mr. Sharma,    Below are the results from your recent visit:    Resulted Orders   HM2(CBC w/o Differential)   Result Value Ref Range    WBC 21.8 (H) 4.0 - 11.0 thou/uL    RBC 4.49 4.40 - 6.20 mill/uL    Hemoglobin 13.8 (L) 14.0 - 18.0 g/dL    Hematocrit 42.4 40.0 - 54.0 %    MCV 95 80 - 100 fL    MCH 30.8 27.0 - 34.0 pg    MCHC 32.5 32.0 - 36.0 g/dL    RDW 11.8 11.0 - 14.5 %    Platelets 292 140 - 440 thou/uL    MPV 7.6 7.0 - 10.0 fL   Comprehensive Metabolic Panel   Result Value Ref Range    Sodium 139 136 - 145 mmol/L    Potassium 4.2 3.5 - 5.0 mmol/L    Chloride 104 98 - 107 mmol/L    CO2 25 22 - 31 mmol/L    Anion Gap, Calculation 10 5 - 18 mmol/L    Glucose 105 70 - 125 mg/dL    BUN 24 8 - 28 mg/dL    Creatinine 1.30 0.70 - 1.30 mg/dL    GFR MDRD Af Amer >60 >60 mL/min/1.73m2    GFR MDRD Non Af Amer 53 (L) >60 mL/min/1.73m2    Bilirubin, Total 0.7 0.0 - 1.0 mg/dL    Calcium 9.2 8.5 - 10.5 mg/dL    Protein, Total 6.5 6.0 - 8.0 g/dL    Albumin 2.1 (L) 3.5 - 5.0 g/dL    Alkaline Phosphatase 118 45 - 120 U/L    AST 11 0 - 40 U/L    ALT 14 0 - 45 U/L    Narrative    Fasting Glucose reference range is 70-99 mg/dL per  American Diabetes Association (ADA) guidelines.        Called and spoke with patient and discussed the elevated white blood count.  This may be due to  prednisone which she is on from his pulmonary medicine specialist for COPD exacerbation with right  chest pneumonia.  However with abdominal distention and diarrhea this elevated white blood count  may represent an acute abdominal process requiring emergency room evaluation and treatment and I  suggested the emergency department closest to him in [facility] of his residence.   Patient  understands and will seek out emergency room evaluation at this time.    Please call with questions or contact us using [employer].    Sincerely,        Electronically signed by Darrick Trammell MD

## 2021-06-20 NOTE — PROGRESS NOTES
Office Visit - Physical    Tyron Sharma   84 y.o. male    Date of Visit: 10/11/2018    Chief Complaint   Patient presents with     Pre-op Exam     Replacement deep lourdes stimulator generator Dr. Grady Fowler at [facility] on 10/16/2018       Subjective: Preoperative examination in anticipation of replacement deep brain stimulator generator on October 16, 2018 by Dr. Cj Fowler at [facility] same day surgery center.    84-year-old male accompanied by his wife here for preoperative examination.    History of Crohn's disease as well as severe tremor improved with deep brain stimulator.  Placed many years ago.  Non-smoker no excess alcohol at this time.  Less so of late.    No known drug allergies except as listed to ciprofloxacin and cephalexin.    ROS: A comprehensive review of systems was performed and was otherwise negative    Medications:   Prior to Admission medications    Medication Sig Start Date End Date Taking? Authorizing Provider   albuterol (PROVENTIL) 2.5 mg /3 mL (0.083 %) nebulizer solution Take 3 mL (2.5 mg total) by nebulization every 6 (six) hours as needed for wheezing. 2/27/18  Yes Drew Duran MD   D3/E/SE/SOY ISOFL/TOCOPH/LYCOP (PROSTATE 2.4 ORAL) Take 2 tablets by mouth daily.    Yes PROVIDER, HISTORICAL   diazePAM (VALIUM) 2 MG tablet Take 2 mg by mouth 2 (two) times a day.   Yes PROVIDER, HISTORICAL   finasteride (PROSCAR) 5 mg tablet Take 5 mg by mouth daily.   Yes PROVIDER, HISTORICAL   fluticasone (FLONASE) 50 mcg/actuation nasal spray 1 with each nostril twice daily.  Patient taking differently: 1 spray into each nostril 2 (two) times a day. 1 with each nostril twice daily. 4/15/16  Yes Darrick Trammell MD   fluticasone-umeclidinium-vilanterol (TRELEGY ELLIPTA) 100-62.5-25 mcg DsDv inhaler Inhale 1 Inhalation daily.   Yes PROVIDER, HISTORICAL   lactobacillus rhamnosus GG (CULTURELLE) 10-15 Billion cell capsule Take 1 capsule by mouth daily.    Yes PROVIDER, HISTORICAL   predniSONE (DELTASONE) 5 MG tablet Take 1 tablet (5 mg total) by mouth daily. 1/29/18  Yes Darrick Trammell MD   propranolol (INDERAL LA) 60 mg 24 hr capsule Take 180 mg by mouth. 4 talbets daily 8/31/18 8/31/19 Yes PROVIDER, HISTORICAL   psyllium with dextrose (METAMUCIL JAR) powder Take by mouth daily.   Yes PROVIDER, HISTORICAL   senna-docusate (PERICOLACE) 8.6-50 mg tablet Take 1 tablet by mouth 2 (two) times a day as needed for constipation.   Yes PROVIDER, HISTORICAL   tamsulosin (FLOMAX) 0.4 mg Cp24 TAKE ONE CAPSULE(0.4MG TOTAL) BY MOUTH ONCE DAILY AFTER SUPPER 11/20/17  Yes Darrick Trammell MD   traMADol (ULTRAM) 50 mg tablet TAKE ONE TABLET BY MOUTH EVERY 8 HOURS AS NEEDED FOR PAIN 7/30/18  Yes Darrick Trammell MD   traZODone (DESYREL) 100 MG tablet TAKE ONE TABLET BY MOUTH ONCE DAILY AT BEDTIME 9/5/18  Yes Darrick Trammell MD   diazePAM (VALIUM) 5 MG tablet Take 2.5 mg by mouth 2 (two) times a day.   10/11/18 Yes PROVIDER, HISTORICAL   albuterol (PROAIR HFA;PROVENTIL HFA;VENTOLIN HFA) 90 mcg/actuation inhaler Inhale 2 puffs every 6 (six) hours as needed for wheezing. 4/24/18   Darrick Trammell MD   artificial tears,hypromellose, (GENTEAL) 0.3 % Drop Administer 1 drop to both eyes 4 (four) times a day as needed (dry eye).    PROVIDER, HISTORICAL   clindamycin (CLEOCIN T) 1 % lotion Apply 1 application topically 2 (two) times a day as needed. prn    PROVIDER, HISTORICAL   furosemide (LASIX) 20 MG tablet Take 1 tablet (20 mg total) by mouth daily for 7 days. 10/11/18 10/18/18  Darrick Trammell MD   ketoconazole (NIZORAL) 2 % shampoo Apply 1 application topically as needed for itching. Apply to damp skin, lather, leave on 5 minutes, and rinse    PROVIDER, HISTORICAL   omeprazole (PRILOSEC) 20 MG capsule TAKE ONE CAPSULE BY MOUTH ONCE DAILY 9/12/18   Darrick Trammell MD   saliva substitution combo no.9 (BIOTENE DRY MOUTH ORAL RINSE) Mwsh 5 mL by Mucous Membrane  route as needed (dry mouth).     PROVIDER, HISTORICAL   furosemide (LASIX) 20 MG tablet Take 1 tablet (20 mg total) by mouth daily for 7 days. 6/22/18 10/11/18  Darrick Trammell MD       Allergies:  Allergies   Allergen Reactions     Ciprofloxacin Diarrhea and Nausea Only     Keflex [Cephalexin] Diarrhea and Nausea Only       Immunizations:   Immunization History   Administered Date(s) Administered     Influenza high dose, seasonal 2015, 10/26/2017     Influenza, inj, historic,unspecified 2007, 10/21/2008, 10/01/2010, 10/03/2016     Pneumo Conj 13-V (2010&after) 2015     Pneumo Polysac 23-V 07/15/2003     Td,adult,historic,unspecified 10/21/2008     ZOSTER, LIVE 2011       Health Maintenance: Immunizations reviewed and up-to-date latest colonoscopy of  showed no sign of Crohn's disease or obstruction.  No neoplasia.    Past Medical History: Crohn's disease with initial presentation as mass right lower quadrant.  Status post partial colectomy for Crohn's without evidence of recurrence followed by Dr. Montgomery in [facility] gastroenterology.    Prior history of essential tremor with deep brain stimulator implantation.    Hypertension and hyperlipidemia and history of multiple plastic surgeries for revisions after squamous cell skin cancers removed.  History of cystic acne.    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    Tonsillectomy and cholecystectomy.    Lumbar and cervical back operations.    Prior partial colectomy for Crohn's right side with obstruction.  No obstruction currently and latest examination free of Crohn's disease or any inflammatory changes or neoplasia.  Followed by Dr. Montgomery [facility] gastroenterology.    Family History: Mother  age 82 cancer of the breast and congestive heart failure.  Former patient of this examiner.    Father  age 62 heart disease.    Children well except one male child with history of thyroid cancer  and lung cancer.    Wife retired registered nurse accompanies patient today is a breast and uterine cancer survivor.    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    Exam Chest clear to auscultation and percussion.  Heart tones regular rhythm without murmur rub or gallop.  Abdomen soft nontender no organomegaly.  No peritoneal signs.  Extremities free of edema cyanosis or clubbing.  Neck veins nondistended no thyromegaly or scleral icterus noted, carotids full.  Skin warm and dry easily conversant good spirited.  Normal intelligence.  Neurologically intact no gross localizing findings.  Enucleation of one eyes cystic changes of skin problems and skin revisions done previously 6 take acne by history.  Mild abdominal distention with fluid wave demonstrated and trace edema noted lower extremities.  No neck vein distention.    Furosemide to be tried 20 mg 4 times a week plus salt restriction and diet with recheck 4-6 weeks time.    Assessment and Plan  Medically acceptable risk for anticipated surgery of replacement deep [facility] electrocardiogram showed no acute changes sinus mechanism rate 59 with baseline right bundle branch block patient and wife reassured regarding the benign nature of this bundle branch block.  Check hemogram comprehensive metabolic profile TSH and brain natruretic peptide level.    Furosemide to be started 20 mg 4 times a week plus salt restriction and diet and recheck 4-6 weeks time.    Total time spent with the patient today was 40 minutes of which greater than 50% was spent in counseling and coordination of care.    Darrick Trammell MD    Patient Active Problem List   Diagnosis     Benign Prostatic Hypertrophy     Hyperlipidemia     Crohn's (Granulomatous) Colitis     Blindness of one eye     Essential tremor     COPD (chronic obstructive pulmonary disease) (H)     S/P total hip  arthroplasty     Hip osteoarthritis     Rotator cuff tear, right     Postoperative pain     Dislocated elbow     Elbow dislocation     Essential hypertension with goal blood pressure less than 140/90     Crohn's disease without complication, unspecified gastrointestinal tract location (H)     GERD without esophagitis     Dislocation, elbow

## 2021-06-20 NOTE — PROGRESS NOTES
Office Visit - Follow up    Tyron Sharma   84 y.o. male    Date of Visit: 9/27/2018    Chief Complaint   Patient presents with     COPD     Hypertension     Medication Questions       Subjective: Hypertension with goal blood pressure less than 140/90.    COPD.  Recently seen by pulmonologist and fluticasone was prescribed the patient has allergies to ciprofloxacin as well as cephalexin.    Some irregularity and bowel activity.  Constipation then diarrhea.  Constipation and diarrhea that combo may be helpful by Metamucil.    Prior history of ileitis with Crohn's or Crohn's in the proximal colon.  Right lower quadrant presentation mass and pain many years ago.  Followed by Dr. Montgomery gastroenterologist [facility].    No blood in stool or urine no weight loss medication list reviewed well-tolerated.    Allergies ciprofloxacin and cephalexin.    ROS: A comprehensive review of systems was performed and was otherwise negative    Medications:  Prior to Admission medications    Medication Sig Start Date End Date Taking? Authorizing Provider   artificial tears,hypromellose, (GENTEAL) 0.3 % Drop Administer 1 drop to both eyes 4 (four) times a day as needed (dry eye).   Yes PROVIDER, HISTORICAL   clindamycin (CLEOCIN T) 1 % lotion Apply 1 application topically 2 (two) times a day as needed. prn   Yes PROVIDER, HISTORICAL   D3/E/SE/SOY ISOFL/TOCOPH/LYCOP (PROSTATE 2.4 ORAL) Take 2 tablets by mouth daily.    Yes PROVIDER, HISTORICAL   diazePAM (VALIUM) 5 MG tablet Take 2.5 mg by mouth 2 (two) times a day.    Yes PROVIDER, HISTORICAL   finasteride (PROSCAR) 5 mg tablet Take 5 mg by mouth daily.   Yes PROVIDER, HISTORICAL   fluticasone (FLONASE) 50 mcg/actuation nasal spray 1 with each nostril twice daily.  Patient taking differently: 1 spray into each nostril 2 (two) times a day. 1 with each nostril twice daily. 4/15/16  Yes Darrick Trammell MD   ketoconazole (NIZORAL) 2 % shampoo Apply 1 application topically as  needed for itching. Apply to damp skin, lather, leave on 5 minutes, and rinse   Yes PROVIDER, HISTORICAL   lactobacillus rhamnosus GG (CULTURELLE) 10-15 Billion cell capsule Take 1 capsule by mouth daily.   Yes PROVIDER, HISTORICAL   omeprazole (PRILOSEC) 20 MG capsule TAKE ONE CAPSULE BY MOUTH ONCE DAILY 9/12/18  Yes Darrick Trammell MD   predniSONE (DELTASONE) 5 MG tablet Take 1 tablet (5 mg total) by mouth daily. 1/29/18  Yes Darrick Trammell MD   propranolol (INDERAL LA) 60 mg 24 hr capsule Take 180 mg by mouth. 8/31/18 8/31/19 Yes PROVIDER, HISTORICAL   saliva substitution combo no.9 (BIOTENE DRY MOUTH ORAL RINSE) Mwsh 5 mL by Mucous Membrane route as needed (dry mouth).    Yes PROVIDER, HISTORICAL   senna-docusate (PERICOLACE) 8.6-50 mg tablet Take 1 tablet by mouth 2 (two) times a day as needed for constipation.   Yes PROVIDER, HISTORICAL   tamsulosin (FLOMAX) 0.4 mg Cp24 TAKE ONE CAPSULE(0.4MG TOTAL) BY MOUTH ONCE DAILY AFTER SUPPER 11/20/17  Yes Darrick Trammell MD   traMADol (ULTRAM) 50 mg tablet TAKE ONE TABLET BY MOUTH EVERY 8 HOURS AS NEEDED FOR PAIN 7/30/18  Yes Darrick Trammell MD   traZODone (DESYREL) 100 MG tablet TAKE ONE TABLET BY MOUTH ONCE DAILY AT BEDTIME 9/5/18  Yes Darrick Trammell MD   albuterol (PROAIR HFA;PROVENTIL HFA;VENTOLIN HFA) 90 mcg/actuation inhaler Inhale 2 puffs every 6 (six) hours as needed for wheezing. 4/24/18   Darrick Trammell MD   albuterol (PROVENTIL) 2.5 mg /3 mL (0.083 %) nebulizer solution Take 3 mL (2.5 mg total) by nebulization every 6 (six) hours as needed for wheezing. 2/27/18   Drew Duran MD   fluticasone-umeclidinium-vilanterol (TRELEGY ELLIPTA) 100-62.5-25 mcg DsDv inhaler Inhale 1 Inhalation daily.    PROVIDER, HISTORICAL   furosemide (LASIX) 20 MG tablet Take 1 tablet (20 mg total) by mouth daily for 7 days. 6/22/18 6/29/18  Darrick Trammell MD   propranolol (INDERAL) 20 MG tablet Take 40 mg by mouth 3 (three) times a day.    9/27/18  PROVIDER, HISTORICAL       Allergies:   Allergies   Allergen Reactions     Ciprofloxacin Diarrhea and Nausea Only     Keflex [Cephalexin] Diarrhea and Nausea Only       Immunizations:   Immunization History   Administered Date(s) Administered     Influenza high dose, seasonal 12/23/2015, 10/26/2017     Influenza, inj, historic,unspecified 11/05/2007, 10/21/2008, 10/01/2010, 10/03/2016     Pneumo Conj 13-V (2010&after) 02/20/2015     Pneumo Polysac 23-V 07/15/2003     Td,adult,historic,unspecified 10/21/2008     ZOSTER, LIVE 06/17/2011       Exam Chest clear to auscultation and percussion.  Heart tones regular rhythm without murmur rub or gallop.  Abdomen soft nontender no organomegaly.  No peritoneal signs.  Extremities free of edema cyanosis or clubbing.  Neck veins nondistended no thyromegaly or scleral icterus noted, carotids full.  Skin warm and dry easily conversant good spirited.  Normal intelligence.  Neurologically intact no gross localizing findings.  -19 noted uses a cane for ambulation.  Slowly movement somewhat infirmed but not cachectic.  Respiratory rate 18 and unlabored pulse 56 O2 sats room air 90%.    Blood pressure 110/58 overweight BMI 29+.    Assessment and Plan  Hypertension controlled.  110/58.    Overweight.  BMI 29.  See below.    Irritable bowel syndrome with history of Crohn's colitis status post resection followed by gastroenterologist Dr. Montgomery.    Suggest Metamucil 1 heaping tablespoon daily.  Irritable bowel syndrome.    Multiple drug allergies including cephalexin and ciprofloxacin.    COPD with borderline hypoxemia.  Room air O2 90% followed by pulmonologist written recent new prescription includes the drug fluticasone discussed and reassured encourage.  RTC 1 month.  Suggest flu vaccine this fall.    Time: total time spent with the patient was 25 minutes of which >50% was spent in counseling and coordination of care    The following high BMI interventions were performed this  visit: encouragement to exercise    Darrick Trammell MD    Patient Active Problem List   Diagnosis     Benign Prostatic Hypertrophy     Hyperlipidemia     Crohn's (Granulomatous) Colitis     Blindness of one eye     Essential tremor     COPD (chronic obstructive pulmonary disease) (H)     S/P total hip arthroplasty     Hip osteoarthritis     Rotator cuff tear, right     Postoperative pain     Dislocated elbow     Elbow dislocation     Essential hypertension with goal blood pressure less than 140/90     Crohn's disease without complication, unspecified gastrointestinal tract location (H)     GERD without esophagitis     Dislocation, elbow

## 2021-06-21 NOTE — PROGRESS NOTES
Office Visit - Follow up    Tyron Sharma   84 y.o. male    Date of Visit: 10/29/2018    Chief Complaint   Patient presents with     Leg Swelling     doing better was on Furosemide for one week       Subjective: Crohn's disease without complication.    Edema lower extremities better after furosemide 20 mg 4 times a week for 1 week then stop.    Cautioned patient regarding salt in diet.  Evidence for chronic kidney disease may be complicating the issue of edema in lower extremities.  Dependent edema.    He denies paroxysmal nocturnal dyspnea or chest pain.    No blood in stool or urine.  MiraLAX has helped his bowel movements.  The pain of arthritis helped by tramadol he does have chronic pain syndrome.  We did discuss excessive use of tramadol as it could be potentially habit-forming as it is a mild opioid.    ROS: A comprehensive review of systems was performed and was otherwise negative    Medications:  Prior to Admission medications    Medication Sig Start Date End Date Taking? Authorizing Provider   artificial tears,hypromellose, (GENTEAL) 0.3 % Drop Administer 1 drop to both eyes 4 (four) times a day as needed (dry eye).   Yes PROVIDER, HISTORICAL   clindamycin (CLEOCIN T) 1 % lotion Apply 1 application topically 2 (two) times a day as needed. prn   Yes PROVIDER, HISTORICAL   D3/E/SE/SOY ISOFL/TOCOPH/LYCOP (PROSTATE 2.4 ORAL) Take 2 tablets by mouth daily.    Yes PROVIDER, HISTORICAL   diazePAM (VALIUM) 2 MG tablet Take 2 mg by mouth 2 (two) times a day.   Yes PROVIDER, HISTORICAL   finasteride (PROSCAR) 5 mg tablet Take 5 mg by mouth daily.   Yes PROVIDER, HISTORICAL   fluticasone (FLONASE) 50 mcg/actuation nasal spray 1 with each nostril twice daily.  Patient taking differently: 1 spray into each nostril 2 (two) times a day. 1 with each nostril twice daily. 4/15/16  Yes Darrick Trammell MD   fluticasone-umeclidinium-vilanterol (TRELEGY ELLIPTA) 100-62.5-25 mcg DsDv inhaler Inhale 1 Inhalation daily.    Yes PROVIDER, HISTORICAL   ketoconazole (NIZORAL) 2 % shampoo Apply 1 application topically as needed for itching. Apply to damp skin, lather, leave on 5 minutes, and rinse   Yes PROVIDER, HISTORICAL   lactobacillus rhamnosus GG (CULTURELLE) 10-15 Billion cell capsule Take 1 capsule by mouth daily.   Yes PROVIDER, HISTORICAL   omeprazole (PRILOSEC) 20 MG capsule TAKE ONE CAPSULE BY MOUTH ONCE DAILY 9/12/18  Yes Darrick Trammell MD   predniSONE (DELTASONE) 5 MG tablet Take 1 tablet (5 mg total) by mouth daily. 1/29/18  Yes Darrick Trammell MD   propranolol (INDERAL LA) 60 mg 24 hr capsule Take 180 mg by mouth. 4 talbets daily 8/31/18 8/31/19 Yes PROVIDER, HISTORICAL   psyllium with dextrose (METAMUCIL JAR) powder Take by mouth daily.   Yes PROVIDER, HISTORICAL   saliva substitution combo no.9 (BIOTENE DRY MOUTH ORAL RINSE) Mwsh 5 mL by Mucous Membrane route as needed (dry mouth).    Yes PROVIDER, HISTORICAL   senna-docusate (PERICOLACE) 8.6-50 mg tablet Take 1 tablet by mouth 2 (two) times a day as needed for constipation.   Yes PROVIDER, HISTORICAL   tamsulosin (FLOMAX) 0.4 mg Cp24 TAKE ONE CAPSULE(0.4MG TOTAL) BY MOUTH ONCE DAILY AFTER SUPPER 11/20/17  Yes Darrick Trammell MD   traMADol (ULTRAM) 50 mg tablet Take 1 tablet (50 mg total) by mouth every 8 (eight) hours as needed for pain. 10/22/18  Yes Darrick Trammell MD   traZODone (DESYREL) 100 MG tablet TAKE ONE TABLET BY MOUTH ONCE DAILY AT BEDTIME 9/5/18  Yes Darrick Trammell MD   albuterol (PROAIR HFA;PROVENTIL HFA;VENTOLIN HFA) 90 mcg/actuation inhaler Inhale 2 puffs every 6 (six) hours as needed for wheezing. 4/24/18   Darrick Trammell MD   albuterol (PROVENTIL) 2.5 mg /3 mL (0.083 %) nebulizer solution Take 3 mL (2.5 mg total) by nebulization every 6 (six) hours as needed for wheezing. 2/27/18   Drew Duran MD   furosemide (LASIX) 20 MG tablet Take 1 tablet (20 mg total) by mouth daily for 7 days. 10/11/18 10/18/18  Darrick CHE  MD Jp       Allergies:   Allergies   Allergen Reactions     Ciprofloxacin Diarrhea and Nausea Only     Keflex [Cephalexin] Diarrhea and Nausea Only       Immunizations:   Immunization History   Administered Date(s) Administered     Influenza high dose, seasonal 12/23/2015, 10/26/2017     Influenza, inj, historic,unspecified 11/05/2007, 10/21/2008, 10/01/2010, 10/03/2016     Pneumo Conj 13-V (2010&after) 02/20/2015     Pneumo Polysac 23-V 07/15/2003     Td,adult,historic,unspecified 10/21/2008     ZOSTER, LIVE 06/17/2011       Exam Chest clear to auscultation and percussion.  Heart tones regular rhythm without murmur rub or gallop.  Abdomen soft nontender no organomegaly.  No peritoneal signs.  Extremities free of edema cyanosis or clubbing.  Neck veins nondistended no thyromegaly or scleral icterus noted, carotids full.  Skin warm and dry easily conversant good spirited.  Normal intelligence.  Neurologically intact no gross localizing findings.  Med list reviewed no blood in stool or urine.  Meds are generally well-tolerated.  Allergies include cephalexin and ciprofloxacin.    Assessment and Plan  Crohn's disease without complication.  MiraLAX helps the bowel movement ciprofloxacin and cephalexin.    Hypertension controlled.    Dependent edema lower extremities better with furosemide 20 mg 4 times a week for 1 week.  Cautioned regarding excessive salt in diet.    Chronic kidney disease may be on the basis of hypertension and chronic nephro sclerosis.    History of hyperlipidemia and multiple skin cancers with multiple prior reconstructive plastic surgeries having been done RTC 2 months time same meds and cares.    Time: total time spent with the patient was 25 minutes of which >50% was spent in counseling and coordination of care    The following high BMI interventions were performed this visit: encouragement to exercise    Darrick Trammell MD    Patient Active Problem List   Diagnosis     Benign Prostatic  Hypertrophy     Hyperlipidemia     Crohn's (Granulomatous) Colitis     Blindness of one eye     Essential tremor     COPD (chronic obstructive pulmonary disease) (H)     S/P total hip arthroplasty     Hip osteoarthritis     Rotator cuff tear, right     Postoperative pain     Dislocated elbow     Elbow dislocation     Essential hypertension with goal blood pressure less than 140/90     Crohn's disease without complication, unspecified gastrointestinal tract location (H)     GERD without esophagitis     Dislocation, elbow

## 2021-06-21 NOTE — PROGRESS NOTES
Assessment and Plan:Tyron Sharma is a 84 y.o. male with a past medical history significant for COPD who presents to clinic today for follow up. He is doing quite well since adding and ICS to his regimen (switched anoro for Trelegy). He no longer needs oxygen and doesn't need his prn meds either.    1) COPD - continue Trelegy 1 puff per day and as needed nebulized or MDI albuterol  2) Already received flu shot  3) Dyspnea - secondary to COPD and deconditioning - did not want to do pulmonary rehab after the first visit.  Is limited by arthritis.  4) RTC in 1 year          CCx: copd follow up    HPI: Mr. Sharma returns for follow up today.  He has a history of  COPD and transient hypoxemic respiratory failure.  He returns for follow up today.  He feels his lungs are doing better.  Since switching to trelegy he hasn't needed oxygen or his nebulizer anymore.  He doesn't really cough.  He hasn't needed prednisone or antibiotics either.  He did go to pulm rehab but didn't like it.  He also took a short course of diuretic after his last visit, which did help.  He is limited by arthritis, and does get short of breath with exertion, but thinks the reasons he can't do all of the things he wants is a combination of his lungs, stamina and arthritis.      ROS:  Review of Systems - History obtained from the patient  General ROS: negative  Psychological ROS: negative  ENT ROS: negative  Allergy and Immunology ROS: negative  Endocrine ROS: negative  Respiratory ROS: positive for - shortness of breath  negative for - cough, hemoptysis, orthopnea, pleuritic pain, tachypnea or wheezing  Cardiovascular ROS: no chest pain or palpitations  Gastrointestinal ROS: no abdominal pain, change in bowel habits, or black or bloody stools  Genito-Urinary ROS: no dysuria, trouble voiding, or hematuria  Musculoskeletal ROS: arthritis pain   Neurological ROS: no TIA or stroke symptoms  Dermatological ROS: negative      Current Meds:  Current  Outpatient Medications   Medication Sig Note     albuterol (PROAIR HFA;PROVENTIL HFA;VENTOLIN HFA) 90 mcg/actuation inhaler Inhale 2 puffs every 6 (six) hours as needed for wheezing.      albuterol (PROVENTIL) 2.5 mg /3 mL (0.083 %) nebulizer solution Take 3 mL (2.5 mg total) by nebulization every 6 (six) hours as needed for wheezing.      artificial tears,hypromellose, (GENTEAL) 0.3 % Drop Administer 1 drop to both eyes 4 (four) times a day as needed (dry eye).      clindamycin (CLEOCIN T) 1 % lotion Apply 1 application topically 2 (two) times a day as needed. prn      D3/E/SE/SOY ISOFL/TOCOPH/LYCOP (PROSTATE 2.4 ORAL) Take 2 tablets by mouth daily.       diazePAM (VALIUM) 2 MG tablet Take 2 mg by mouth 2 (two) times a day.      finasteride (PROSCAR) 5 mg tablet Take 5 mg by mouth daily.      fluticasone (FLONASE) 50 mcg/actuation nasal spray 1 with each nostril twice daily. (Patient taking differently: 1 spray into each nostril 2 (two) times a day. 1 with each nostril twice daily.)      fluticasone-umeclidinium-vilanterol (TRELEGY ELLIPTA) 100-62.5-25 mcg DsDv inhaler Inhale 1 Inhalation daily.      furosemide (LASIX) 20 MG tablet Take 1 tablet (20 mg total) by mouth daily for 7 days.      ketoconazole (NIZORAL) 2 % shampoo Apply 1 application topically as needed for itching. Apply to damp skin, lather, leave on 5 minutes, and rinse      lactobacillus rhamnosus GG (CULTURELLE) 10-15 Billion cell capsule Take 1 capsule by mouth daily.      omeprazole (PRILOSEC) 20 MG capsule TAKE ONE CAPSULE BY MOUTH ONCE DAILY      predniSONE (DELTASONE) 5 MG tablet Take 1 tablet (5 mg total) by mouth daily.      propranolol (INDERAL LA) 60 mg 24 hr capsule Take 180 mg by mouth. 4 talbets daily 9/27/2018: Received from: [facility] Received Sig: Take 3 Capsules by mouth daily.     psyllium with dextrose (METAMUCIL JAR) powder Take by mouth daily.      saliva substitution combo no.9 (BIOTENE DRY MOUTH ORAL RINSE) Mwsh 5 mL by  Mucous Membrane route as needed (dry mouth).       senna-docusate (PERICOLACE) 8.6-50 mg tablet Take 1 tablet by mouth 2 (two) times a day as needed for constipation.      tamsulosin (FLOMAX) 0.4 mg Cp24 TAKE ONE CAPSULE(0.4MG TOTAL) BY MOUTH ONCE DAILY AFTER SUPPER      traMADol (ULTRAM) 50 mg tablet Take 1 tablet (50 mg total) by mouth every 8 (eight) hours as needed for pain.      traZODone (DESYREL) 100 MG tablet TAKE ONE TABLET BY MOUTH ONCE DAILY AT BEDTIME        Labs:  No results found for this or any previous visit (from the past 72 hour(s)).    I have personally reviewed all pertinent imaging studies and PFT results unless otherwise noted.    Imaging studies:  Xr Elbow Right 2 Vws Portable    Result Date: 1/12/2017  XR ELBOW RIGHT 2 VWS PORTABLE 1/12/2017 2:17 PM INDICATION: Follow-up repair. External fixator removal.    COMPARISON: 11/10/2016 FINDINGS: 45 seconds of fluoroscopic time provided during the procedure. AP, oblique and lateral views obtained with the external fixator in place and then with the external fixator removed.    Xr C Arm Greater Than One Hour    Result Date: 1/12/2017  Please see the Radiology Report for result for body part of interest.         Physical Exam:  /54   Pulse 60   Resp 20   Wt 188 lb 3.2 oz (85.4 kg)   SpO2 91% Comment: RA  BMI 28.62 kg/m    General - Well nourished  Ears/Mouth -  OP pink moist, no thrush  Neck - no cervical lymphadenopathy  Lungs - coarse throughout, diminished airflow  CVS - regular rhythm with no murmurs, rubs or gallups  Abdomen - soft, NT, ND, NABS  Ext - no cyanosis, clubbing or edema - arthritic hands  Skin - no rash  Psychology - alert and oriented, answers appropriate        Electronically signed by:    Rick Pulliam MD PhD  [facility]

## 2021-06-23 NOTE — PROGRESS NOTES
Office Visit - Follow up    Tyron Sharma   84 y.o. male    Date of Visit: 1/24/2019    Chief Complaint   Patient presents with     COPD     Hypertension     Crohn's Disease       Subjective: Hypertension with COPD and Crohn's disease.    Asked for refills of diphenoxylate note allergy to ciprofloxacin and cephalexin.    No blood in stool or urine denies chest pain or shortness of breath otherwise feels well.    Offered laboratory testing patient declined at this juncture.    No blood in stool or urine medication list reviewed well-tolerated normal effects.    Allergies ciprofloxacin and cephalexin.    ROS: A comprehensive review of systems was performed and was otherwise negative    Medications:  Prior to Admission medications    Medication Sig Start Date End Date Taking? Authorizing Provider   artificial tears,hypromellose, (GENTEAL) 0.3 % Drop Administer 1 drop to both eyes 4 (four) times a day as needed (dry eye).   Yes PROVIDER, HISTORICAL   D3/E/SE/SOY ISOFL/TOCOPH/LYCOP (PROSTATE 2.4 ORAL) Take 2 tablets by mouth daily.    Yes PROVIDER, HISTORICAL   diazePAM (VALIUM) 2 MG tablet Take 2 mg by mouth 2 (two) times a day.   Yes PROVIDER, HISTORICAL   finasteride (PROSCAR) 5 mg tablet Take 5 mg by mouth daily.   Yes PROVIDER, HISTORICAL   fluticasone (FLONASE) 50 mcg/actuation nasal spray 1 with each nostril twice daily.  Patient taking differently: 1 spray into each nostril 2 (two) times a day. 1 with each nostril twice daily. 4/15/16  Yes Darrick Trammell MD   fluticasone-umeclidinium-vilanterol (TRELEGY ELLIPTA) 100-62.5-25 mcg DsDv inhaler Inhale 1 Inhalation daily.   Yes PROVIDER, HISTORICAL   ketoconazole (NIZORAL) 2 % shampoo Apply 1 application topically as needed for itching. Apply to damp skin, lather, leave on 5 minutes, and rinse   Yes PROVIDER, HISTORICAL   lactobacillus rhamnosus GG (CULTURELLE) 10-15 Billion cell capsule Take 1 capsule by mouth daily.   Yes PROVIDER, HISTORICAL   omeprazole  (PRILOSEC) 20 MG capsule TAKE ONE CAPSULE BY MOUTH ONCE DAILY 9/12/18  Yes Darrick Trammell MD   predniSONE (DELTASONE) 5 MG tablet Take 1 tablet (5 mg total) by mouth daily. 1/29/18  Yes Darrick Trammell MD   propranolol (INDERAL LA) 60 mg 24 hr capsule Take 180 mg by mouth. 4 talbets daily 8/31/18 8/31/19 Yes PROVIDER, HISTORICAL   psyllium with dextrose (METAMUCIL JAR) powder Take by mouth daily.   Yes PROVIDER, HISTORICAL   saliva substitution combo no.9 (BIOTENE DRY MOUTH ORAL RINSE) Mwsh 5 mL by Mucous Membrane route as needed (dry mouth).    Yes PROVIDER, HISTORICAL   senna-docusate (PERICOLACE) 8.6-50 mg tablet Take 1 tablet by mouth 2 (two) times a day as needed for constipation.   Yes PROVIDER, HISTORICAL   tamsulosin (FLOMAX) 0.4 mg Cp24 TAKE ONE CAPSULE(0.4MG TOTAL) BY MOUTH ONCE DAILY AFTER SUPPER 11/20/17  Yes Darrick Trammell MD   traMADol (ULTRAM) 50 mg tablet TAKE 1 TABLET BY MOUTH EVERY 8 HOURS AS NEEDED FOR PAIN 12/31/18  Yes Darrick Trammell MD   traZODone (DESYREL) 100 MG tablet TAKE ONE TABLET BY MOUTH ONCE DAILY AT BEDTIME 9/5/18  Yes Darrick Trammell MD   albuterol (PROAIR HFA;PROVENTIL HFA;VENTOLIN HFA) 90 mcg/actuation inhaler Inhale 2 puffs every 6 (six) hours as needed for wheezing. 4/24/18   Darrick Trammell MD   albuterol (PROVENTIL) 2.5 mg /3 mL (0.083 %) nebulizer solution Take 3 mL (2.5 mg total) by nebulization every 6 (six) hours as needed for wheezing. 11/14/18   Rick Pulliam MD   clindamycin (CLEOCIN T) 1 % lotion Apply 1 application topically 2 (two) times a day as needed. prn    PROVIDER, HISTORICAL   furosemide (LASIX) 20 MG tablet Take 1 tablet (20 mg total) by mouth daily for 7 days. 10/11/18 11/14/18  Darrick Trammell MD       Allergies:   Allergies   Allergen Reactions     Ciprofloxacin Diarrhea and Nausea Only     Keflex [Cephalexin] Diarrhea and Nausea Only       Immunizations:   Immunization History   Administered Date(s)  Administered     Influenza high dose, seasonal 12/23/2015, 10/26/2017     Influenza, inj, historic,unspecified 11/05/2007, 10/21/2008, 10/01/2010, 10/03/2016     Pneumo Conj 13-V (2010&after) 02/20/2015     Pneumo Polysac 23-V 07/15/2003     Td,adult,historic,unspecified 10/21/2008     ZOSTER, LIVE 06/17/2011       Exam Chest clear to auscultation and percussion.  Heart tones regular rhythm without murmur rub or gallop.  Abdomen soft nontender no organomegaly.  No peritoneal signs.  Extremities free of edema cyanosis or clubbing.  Neck veins nondistended no thyromegaly or scleral icterus noted, carotids full.  Skin warm and dry easily conversant good spirited.  Normal intelligence.  Neurologically intact no gross localizing findings.  Acneiform scarring some scarring from prior thalamic brain stimulators for incapacitating tremor are noted in the scalp and scalp.  He is easily conversant not in acute distress not toxic accompanied by his wife.  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  There is no leg edema neck vein distention the abdomen is soft nontender he has not seen his colonoscopy with her gastroenterologist in some period of time.  We did discuss Metamucil also for irritable bowel syndrome symptoms.  When last examined colonoscopy there was no sign of active Crohn's disease or Crohn's disease whatsoever.  Dr. Montgomery from [facility] colonoscopies.    Assessment and Plan  Hypertension with history of Crohn's disease and COPD.  Offered laboratory testing patient declined.    Multiple drug allergies including ciprofloxacin and cephalexin.    Blindness 1 9 accidental injury.  History of bilateral thalamic brain stimulators for essential tremor incapacitating no tremor at this juncture.    120/60 pulse 60 respirations 18 O2 sats room air 93%.  RTC 3 months time.    Time: total time spent with the patient was 25 minutes of which >50% was spent in  counseling and coordination of care    The following high BMI interventions were performed this visit: encouragement to exercise    Darrick Trammell MD    Patient Active Problem List   Diagnosis     Benign Prostatic Hypertrophy     Hyperlipidemia     Crohn's (Granulomatous) Colitis     Blindness of one eye     Essential tremor     COPD (chronic obstructive pulmonary disease) (H)     S/P total hip arthroplasty     Hip osteoarthritis     Rotator cuff tear, right     Postoperative pain     Dislocated elbow     Elbow dislocation     Essential hypertension with goal blood pressure less than 140/90     Crohn's disease without complication, unspecified gastrointestinal tract location (H)     GERD without esophagitis     Dislocation, elbow

## 2021-06-23 NOTE — TELEPHONE ENCOUNTER
Dr. Trammell,  Patient was last in clinic on 1/24/19 and thought he was going to get prescriptions for nausea medication and diarrhea medication.  I don't show any medications prescribed at that office visit.  Please advise.  Thank you.  Alyx IVY CMA/VANDANA....................11:33 AM

## 2021-06-23 NOTE — TELEPHONE ENCOUNTER
Dr. Trammell,  Refill requests have been set up for you to review in a separate encounter.  Please verify dose of diphenoxylate.    Thank you.  Alyx IVY CMA/VANDANA....................12:01 PM    Spoke with the patient and relayed message below from Dr. Trammell.  Patient verbalized understanding and had no further questions at this time.

## 2021-06-23 NOTE — TELEPHONE ENCOUNTER
Medication Question or Clarification  Who is calling: Patient  What medication are you calling about?: Patient thought he was to get one medication for Nausea and one for Diarrhea   What dose do you take?:  n/a  How often are you taking the medication?:  n/a  Who prescribed the medication?:  Jp  What is your question/concern?:  Patient was seen on 1/24/2018 and was told he was going to have a couple medications sent to  Pharmacy  One for nausea and one for dirrhea  Pharmacy:  [facility] in [place]  Okay to leave a detailed message?: Yes  [phone]  Site CMT - Please call the pharmacy to obtain any additional needed information.

## 2021-06-23 NOTE — TELEPHONE ENCOUNTER
These call patient and his pharmacy.    Okay for Zofran 4 mg tablets #40 take 1 tablet once daily as needed for nausea with 1 refill.    Also okay for diphenoxylate or Lomotil No. 40 tablets take 1 tablet daily as needed for diarrhea.  With 1 refill.    Please call patient and his pharmacy.

## 2021-06-23 NOTE — TELEPHONE ENCOUNTER
Called and spoke with pharmacist. This medication is a controlled substance he can only fill for 7 days, with 1 refill. But second fill can be qty of 40 tablets. This is due to the  Controlled substance act and insurance.     FYI

## 2021-06-23 NOTE — TELEPHONE ENCOUNTER
Medication Question or Clarification  Who is calling: Pharmacy: [facility]  What medication are you calling about?:   diphenoxylate-atropine (LOMOTIL) 2.5-0.025 mg per tablet 40 tablet 1 1/28/2019     Sig - Route: Take 1 tablet by mouth daily. - Oral      Who prescribed the medication?: Dr Trammell  What is your question/concern?: Pharmacy needs clarification if this is long term use or as needed.  Pharmacy: [facility] [place], [place]  Okay to leave a detailed message?: Yes  Site CMT - Please call the pharmacy to obtain any additional needed information.

## 2021-06-25 NOTE — TELEPHONE ENCOUNTER
Who is calling:  Patient  Reason for Call:  Patient will like a copy of his healthcare directive mailed to himself AND to Neurologist IRON Cook at:  [address] [place] [address]    Patient declined medical records dept.  Date of last appointment with primary care: 10/29/18  Okay to leave a detailed message: Yes

## 2022-02-27 NOTE — TELEPHONE ENCOUNTER
Ongoing SW/CM Assessment/Plan of Care Note     See SW/CM flowsheets for goals and other objective data.    Patient/Family discharge goal (s):  Goal #1: Psychosocial needs assessed          PT Recommendation:     Recommendation for Discharge: PT IL: Patient requires  intermittent assistance to perform mobility and/or ADLs safely      Disposition:   home    Progress note:   Discharge home with [facility] when medically ready          Lamar notified and will bring him to ER for evaluation. Marquita Lezama, CMA